# Patient Record
Sex: MALE | Race: WHITE | NOT HISPANIC OR LATINO | Employment: OTHER | URBAN - METROPOLITAN AREA
[De-identification: names, ages, dates, MRNs, and addresses within clinical notes are randomized per-mention and may not be internally consistent; named-entity substitution may affect disease eponyms.]

---

## 2017-09-27 ENCOUNTER — ALLSCRIPTS OFFICE VISIT (OUTPATIENT)
Dept: OTHER | Facility: OTHER | Age: 62
End: 2017-09-27

## 2017-10-27 NOTE — PROGRESS NOTES
Assessment  1  Status post insertion of drug-eluting stent into left anterior descending (LAD) artery for   coronary artery disease (V45 82) (Z95 5)   2  Type 2 diabetes mellitus with hyperglycemia (250 00) (E11 65)   3  Benign essential hypertension (401 1) (I10)   4  Dyslipidemia (272 4) (E78 5)   5  Obesity, Class I, BMI 30-34 9 (278 00) (E66 9)   6  Insomnia due to medical condition (327 01) (G47 01)   7  Easy fatigability (780 79) (R53 83)    Plan   · EKG/ECG- POC; Status:Complete;   Done: 36THM8832   Perform: In Office; [phone]; Last Updated By:Jose Juan Gates; 9/27/2017 2:30:21 PM;Ordered; For:Benign essential hypertension; Ordered By:Juan Conrad;   · Farxiga 5 MG Oral Tablet; TAKE 1 TABLET BY MOUTH EVERY MORNING   Rx By: Deirdre Vidal; Dispense: 30 Days ; #:30 Tablet; Refill: 0;For: Type 2 diabetes mellitus with hyperglycemia; ROSA = N; Record   · Toujeo SoloStar 300 UNIT/ML Subcutaneous Solution Pen-injector; INJECT 60  UNIT Daily   Rx By: Deirdre Vidal; Dispense: 0 Days ; #:3 ML; Refill: 0;For: Type 2 diabetes mellitus with hyperglycemia; ROSA = N; Record      1  Continue present medications  2  Obtain last comprehensive lab work from office of Dr Markie Davis  3  Letter to Dr Markie Daivs including suggestion to evaluate for sleep apnea  4  Six-month cardiology follow-up with EKG  Discussion/Summary      Stable CAD with anterior wall myocardial infarction and PCI of LAD 03/17/2014-[facility] had Resolute Integrity Rx 3 0 x 30 mm drug-eluting stent placed  Longstanding insulin-dependent diabetes mellitus, type 2 the  Controlled essential hypertensionTreated dyslipidemiaRecurrent cellulitis of bilateral pretibial regionsHistory of insomnia and daytime fatigue with consideration of obstructive sleep apnea  History of abdominal aortic aneurysmFormer chronic smoking of 1 pack per day for 43 years with none for 3-1/2 yearsObesity, class 1    The patient was counseled regarding diagnostic results,-- instructions for management,-- risk factor reductions,-- prognosis,-- patient and family education,-- impressions,-- importance of compliance with treatment  The patient has the current Goals: Maintenance of cardiovascular health  The patent has the current Barriers: None  Patient is able to Self-Care  The treatment plan was reviewed with the patient/guardian  The patient/guardian understands and agrees with the treatment plan     Self Referrals: Yes      Chief Complaint  Vincent Shipman made the appointment to come here on his own  He was a patient of [address]  Release was signed and faxed today to obtain those records, he also has a stent through [address]  He states he is short of breath at times  Patient is also taking Faxia? and Tuojeo? JW      History of Present Illness  HPI:   70-year-old man is self-referred for cardiology follow-up with his prior cardiologist no longer available  His history is pertinent for an anterior wall myocardial infarction followed by PCI of LAD on 03/17/2014 at [facility]  At that time, he had a 100% thrombotic occlusion of mid LAD, 50% mid LCX, ostial disease of a nondominant RCA with 45% ejection fraction  An echocardiogram on 12/27/2016 showed 60% ejection fraction with trace AI and no other significant abnormality  The patient has known hypertension, mixed dyslipidemia, past history of abdominal aortic aneurysm, longstanding diabetes mellitus, insulin dependent, was a former smoker of 1 pack per day for 40 years, having quit in March, 2014  There is no history of alcohol or illicit drug abuse  patient had a nuclear stress test on 12/27/2016 showing moderate exercise tolerance of 8 metabolic equivalents and normal nuclear perfusion with normal LV systolic function including ejection fraction of 59%  patient currently complains of easy fatigue, insomnia, diffuse myalgias, arthralgias, but denies chest pain, dyspnea, syncope, palpitations   He has had lower extremity swelling for 3 and half years  He dates this back to the time of his myocardial infarction  patient still works as a  full-time, averaging 48 hours per week  Review of Systems    ROS reviewed  All other systems negative, except as noted in history of present illness  Active Problems  1  _ (272 2)   2  Benign essential hypertension (401 1) (I10)   3  Contusion Of The Toe(S) With Intact Skin Surface (924 3)   4  Organic impotence (607 84) (N52 9)   5  Type 2 diabetes mellitus with hyperglycemia (250 00) (E11 65)    Past Medical History      Insulin-dependent diabetes mellitus, type 2 since the age of 36,  long-standing hypertension, dyslipidemia, recurrent superficial cellulitis of both lower extremities  The active problems and past medical history were reviewed and updated today  Surgical History    The surgical history was reviewed and updated today  Family History  Mother    · No pertinent family history    The family history was reviewed and updated today  Social History   · Former smoker (O91 00) (E38 875)  The social history was reviewed and updated today  Patient is a  at "[employer] and works four 12 hour shifts per week  He lives home with his long-term fiancee for the past 28 years, has 2 grown children and 2 grandchildren  He is   No tobacco use since March, 2014 and no alcohol or illicit drug use      Current Meds   1  Aspirin 81 MG Oral Tablet Delayed Release; TAKE 1 TABLET DAILY; Therapy: 37TTN1869 to Recorded   2  Atorvastatin Calcium 80 MG Oral Tablet; TAKE 1 TABLET DAILY; Therapy: 76BBR8334 to (Renew:07Oct2017) Recorded   3  BD Insulin Syringe 29G X 1/2 0 5 ML MISC; as directed; Therapy: 37Uzb6151 to  Requested for: 58CPO5833 Recorded   4  BD Pen Needle Mini U/F 31G X 5 MM Miscellaneous; use daily; Therapy: 99Nmz0503 to  Requested for: 07XGT1623 Recorded   5   Carvedilol 6 25 MG Oral Tablet; 1 two times a day;   Therapy: 91KYA0979 to Recorded   6  Glimepiride 4 MG Oral Tablet; TAKE 1 TABLET TWICE DAILY; Therapy: 44CDU7348 to Recorded   7  HumaLOG KwikPen 100 UNIT/ML SOLN; USE AS DIRECTED; Therapy: 75SNJ8015 to  Requested for: 16HTZ1497 Recorded   8  Lancets Thin Miscellaneous; use as needed; Therapy: 54CIS0417 to  Requested for: 51YDH0809 Recorded   9  Lisinopril 5 MG Oral Tablet; TAKE 1 TABLET DAILY; Therapy: 77FJF3570 to (Carina Lair) Recorded   10  Spironolactone 25 MG Oral Tablet; Take 1 tablet daily; Therapy: 20PTJ2883 to Recorded    The medication list was reviewed and updated today  Allergies  1  No Known Allergies    Vitals  Signs   Heart Rate: 87, Apical  Systolic: 959, RUE, Sitting  Diastolic: 78, RUE, Sitting  BP Cuff Size: Large  Height: 5 ft 11 in  Weight: 216 lb 4 8 oz  BMI Calculated: 30 17  BSA Calculated: 2 18  O2 Saturation: 95, RA    Physical Exam    Constitutional   General appearance: Abnormal  -- Obese white male in no acute distress  He is alert, oriented, and cooperative  Eyes   Conjunctiva and Sclera examination: Conjunctiva pink, sclera anicteric  Ears, Nose, Mouth, and Throat - Oropharynx: Clear, nares are clear, mucous membranes are moist    Neck   Neck and thyroid: Normal, supple, trachea midline, no thyromegaly  Pulmonary   Respiratory effort: No increased work of breathing or signs of respiratory distress  Auscultation of lungs: Clear to auscultation, no rales, no rhonchi, no wheezing, good air movement  Cardiovascular   Auscultation of heart: Normal rate and rhythm, normal S1 and S2, no murmurs  Carotid pulses: Normal, 2+ bilaterally  Peripheral vascular exam: Normal pulses throughout, no tenderness, erythema or swelling  Pedal pulses: Normal, 2+ bilaterally  Examination of extremities for edema and/or varicosities: Normal     Abdomen   Abdomen: Abnormal  -- Obese and nontender with no masses, organomegaly, or guarding     Liver and spleen: No hepatomegaly or splenomegaly  Musculoskeletal Gait and station: Normal gait  -- Digits and nails: Normal without clubbing or cyanosis  -- Inspection/palpation of joints, bones, and muscles: Normal, ROM normal     Skin - Skin and subcutaneous tissue: Normal without rashes or lesions  Skin is warm and well perfused, normal turgor  Neurologic - Cranial nerves: II - XII intact  -- Speech: Normal     Psychiatric - Orientation to person, place, and time: Normal -- Mood and affect: Normal       Results/Data  EKG 09/27/2017frontal plane QRS axis  unremarkable EKG      Signatures   Electronically signed by : HAN Patel ; Sep 27 2017 11:46PM EST                       (Author)

## 2018-01-15 VITALS
WEIGHT: 216.3 LBS | BODY MASS INDEX: 30.28 KG/M2 | HEART RATE: 87 BPM | OXYGEN SATURATION: 95 % | SYSTOLIC BLOOD PRESSURE: 130 MMHG | DIASTOLIC BLOOD PRESSURE: 78 MMHG | HEIGHT: 71 IN

## 2018-01-15 NOTE — CONSULTS
Dear Dr Lyn Vu,      I had the pleasure of evaluating your patient, JONEL SILVESTRE  My full evaluation follows:          Chief Complaint  Maureen Valdes made the appointment to come here on his own  He was a patient of [address]  Release was signed and faxed today to obtain those records, he also has a stent through [address]  He states he is short of breath at times  Patient is also taking Faxia? and Tuojeo? JW      History of Present Illness  HPI:     This 80-year-old man is self-referred for cardiology follow-up with his prior cardiologist no longer available  His history is pertinent for an anterior wall myocardial infarction followed by PCI of LAD on 03/17/2014 at [facility]  At that time, he had a 100% thrombotic occlusion of mid LAD, 50% mid LCX, ostial disease of a nondominant RCA with 45% ejection fraction  An echocardiogram on 12/27/2016 showed 60% ejection fraction with trace AI and no other significant abnormality  The patient has known hypertension, mixed dyslipidemia, past history of abdominal aortic aneurysm, longstanding diabetes mellitus, insulin dependent, was a former smoker of 1 pack per day for 40 years, having quit in March, 2014  There is no history of alcohol or illicit drug abuse  The patient had a nuclear stress test on 12/27/2016 showing moderate exercise tolerance of 8 metabolic equivalents and normal nuclear perfusion with normal LV systolic function including ejection fraction of 59%  The patient currently complains of easy fatigue, insomnia, diffuse myalgias, arthralgias, but denies chest pain, dyspnea, syncope, palpitations  He has had lower extremity swelling for 3 and half years  He dates this back to the time of his myocardial infarction  The patient still works as a  full-time, averaging 48 hours per week  Review of Systems    ROS reviewed  All other systems negative, except as noted in history of present illness  Active Problems    1  _ (272 2)   2  Benign essential hypertension (401 1) (I10)   3  Contusion Of The Toe(S) With Intact Skin Surface (924 3)   4  Organic impotence (607 84) (N52 9)   5  Type 2 diabetes mellitus with hyperglycemia (250 00) (E11 65)    Past Medical History      Insulin-dependent diabetes mellitus, type 2 since the age of 36,  long-standing hypertension, dyslipidemia, recurrent superficial cellulitis of both lower extremities  The active problems and past medical history were reviewed and updated today  Surgical History    The surgical history was reviewed and updated today  Family History    · No pertinent family history    The family history was reviewed and updated today  Social History    · Former smoker (U47 41) (A59 700)  The social history was reviewed and updated today  Patient is a  at [email or web address] and works four 12 hour shifts per week  He lives home with his long-term fiancee for the past 28 years, has 2 grown children and 2 grandchildren  He is   No tobacco use since March, 2014 and no alcohol or illicit drug use      Current Meds   1  Aspirin 81 MG Oral Tablet Delayed Release; TAKE 1 TABLET DAILY; Therapy: 85GTC1145 to Recorded   2  Atorvastatin Calcium 80 MG Oral Tablet; TAKE 1 TABLET DAILY; Therapy: 74TLS1615 to (Renew:07Oct2017) Recorded   3  BD Insulin Syringe 29G X 1/2" 0 5 ML MISC; as directed; Therapy: 44Wya7563 to  Requested for: 22EDA6859 Recorded   4  BD Pen Needle Mini U/F 31G X 5 MM Miscellaneous; use daily; Therapy: 15Nrw6902 to  Requested for: 83ZZE3977 Recorded   5  Carvedilol 6 25 MG Oral Tablet; 1 two times a day; Therapy: 81OEF5730 to Recorded   6  Glimepiride 4 MG Oral Tablet; TAKE 1 TABLET TWICE DAILY; Therapy: 09ZMI4975 to Recorded   7  HumaLOG KwikPen 100 UNIT/ML SOLN; USE AS DIRECTED; Therapy: 77ZAN7445 to  Requested for: 22CKW8909 Recorded   8  Lancets Thin Miscellaneous; use as needed;    Therapy: 97OFB4256 to  Requested for: 94HTA7447 Recorded   9  Lisinopril 5 MG Oral Tablet; TAKE 1 TABLET DAILY; Therapy: 85BBT5212 to (Rohit Flower) Recorded   10  Spironolactone 25 MG Oral Tablet; Take 1 tablet daily; Therapy: 68FJW3123 to Recorded    The medication list was reviewed and updated today  Allergies    1  No Known Allergies    Vitals  Signs    Heart Rate: 87, Apical  Systolic: 711, RUE, Sitting  Diastolic: 78, RUE, Sitting  BP Cuff Size: Large  Height: 5 ft 11 in  Weight: 216 lb 4 8 oz  BMI Calculated: 30 17  BSA Calculated: 2 18  O2 Saturation: 95, RA    Physical Exam    Constitutional   General appearance: Abnormal   Obese white male in no acute distress  He is alert, oriented, and cooperative  Eyes   Conjunctiva and Sclera examination: Conjunctiva pink, sclera anicteric  Ears, Nose, Mouth, and Throat - Oropharynx: Clear, nares are clear, mucous membranes are moist    Neck   Neck and thyroid: Normal, supple, trachea midline, no thyromegaly  Pulmonary   Respiratory effort: No increased work of breathing or signs of respiratory distress  Auscultation of lungs: Clear to auscultation, no rales, no rhonchi, no wheezing, good air movement  Cardiovascular   Auscultation of heart: Normal rate and rhythm, normal S1 and S2, no murmurs  Carotid pulses: Normal, 2+ bilaterally  Peripheral vascular exam: Normal pulses throughout, no tenderness, erythema or swelling  Pedal pulses: Normal, 2+ bilaterally  Examination of extremities for edema and/or varicosities: Normal     Abdomen   Abdomen: Abnormal   Obese and nontender with no masses, organomegaly, or guarding  Liver and spleen: No hepatomegaly or splenomegaly  Musculoskeletal Gait and station: Normal gait  Digits and nails: Normal without clubbing or cyanosis  Inspection/palpation of joints, bones, and muscles: Normal, ROM normal     Skin - Skin and subcutaneous tissue: Normal without rashes or lesions  Skin is warm and well perfused, normal turgor  Neurologic - Cranial nerves: II - XII intact  Speech: Normal     Psychiatric - Orientation to person, place, and time: Normal  Mood and affect: Normal       Results/Data  EKG 09/27/2017  Rightward frontal plane QRS axis  Otherwise unremarkable EKG      Assessment    1  Status post insertion of drug-eluting stent into left anterior descending (LAD) artery for   coronary artery disease (V45 82) (Z95 5)   2  Type 2 diabetes mellitus with hyperglycemia (250 00) (E11 65)   3  Benign essential hypertension (401 1) (I10)   4  Dyslipidemia (272 4) (E78 5)   5  Obesity, Class I, BMI 30-34 9 (278 00) (E66 9)   6  Insomnia due to medical condition (327 01) (G47 01)   7  Easy fatigability (780 79) (R53 83)    Plan  Benign essential hypertension    · EKG/ECG- POC; Status:Complete;   Done: 81GDZ8474   Perform: In Office; [phone]; Last Updated By:Stephanie Gates; 9/27/2017 2:30:21 PM;Ordered; For:Benign essential hypertension; Ordered By:Juan Conrad;  Type 2 diabetes mellitus with hyperglycemia    · Farxiga 5 MG Oral Tablet; TAKE 1 TABLET BY MOUTH EVERY MORNING   Rx By: Berto German; Dispense: 30 Days ; #:30 Tablet; Refill: 0; For: Type 2 diabetes mellitus with hyperglycemia; ROSA = N; Record   · Toujeo SoloStar 300 UNIT/ML Subcutaneous Solution Pen-injector; INJECT 60  UNIT Daily   Rx By: Berto German; Dispense: 0 Days ; #:3 ML; Refill: 0; For: Type 2 diabetes mellitus with hyperglycemia; ROSA = N; Record      1  Continue present medications  2  Obtain last comprehensive lab work from office of Dr Ivone Morrell  3  Letter to Dr Ivone Morrell including suggestion to evaluate for sleep apnea  4  Six-month cardiology follow-up with EKG  Discussion/Summary        1  Stable CAD with anterior wall myocardial infarction and PCI of LAD 03/17/2014-[address]  Patient had Resolute Integrity Rx 3 0 x 30 mm drug-eluting stent placed    2  Longstanding insulin-dependent diabetes mellitus, type 2 the   3  Controlled essential hypertension  4  Treated dyslipidemia  5  Recurrent cellulitis of bilateral pretibial regions  6  History of insomnia and daytime fatigue with consideration of obstructive sleep apnea  7  History of abdominal aortic aneurysm  8  Former chronic smoking of 1 pack per day for 43 years with none for 3-1/2 years  9  Obesity, class 1    The patient was counseled regarding diagnostic results, instructions for management, risk factor reductions, prognosis, patient and family education, impressions, importance of compliance with treatment  The patient has the current Goals: Maintenance of cardiovascular health  The patent has the current Barriers: None  Patient is able to Self-Care  The treatment plan was reviewed with the patient/guardian  The patient/guardian understands and agrees with the treatment plan     Self Referrals: Yes          Thank you very much for allowing me to participate in the care of this patient  If you have any questions, please do not hesitate to contact me  With warmest regards,    Renetta Downey MD      Signatures   Electronically signed by : HAN Aceves ; Sep 27 2017 11:46PM EST                       (Author) Consent (Scalp)/Introductory Paragraph: The rationale for Mohs was explained to the patient and consent was obtained. The risks, benefits and alternatives to therapy were discussed in detail. Specifically, the risks of changes in hair growth pattern secondary to repair, infection, scarring, bleeding, prolonged wound healing, incomplete removal, allergy to anesthesia, nerve injury and recurrence were addressed. Prior to the procedure, the treatment site was clearly identified and confirmed by the patient. All components of Universal Protocol/PAUSE Rule completed.

## 2020-05-01 ENCOUNTER — TELEPHONE (OUTPATIENT)
Dept: CARDIOLOGY CLINIC | Facility: CLINIC | Age: 65
End: 2020-05-01

## 2020-06-08 ENCOUNTER — TELEPHONE (OUTPATIENT)
Dept: PHYSICAL THERAPY | Facility: OTHER | Age: 65
End: 2020-06-08

## 2021-02-10 ENCOUNTER — TELEPHONE (OUTPATIENT)
Dept: CARDIOLOGY CLINIC | Facility: CLINIC | Age: 66
End: 2021-02-10

## 2021-02-10 NOTE — TELEPHONE ENCOUNTER
COVID Pre-Visit Screening       1  Have you traveled outside of your state in the past 2 weeks? No  2  Do you presently have a fever or flu-like symptoms? No  3  Do you have symptoms of an upper respiratory infection like runny nose, sore throat, or cough? No  4  Are you suffering from new headache that you have not had in the past?  No  5  Do you have/have you experienced any new shortness of breath recently? No  6  Do you have any new diarrhea, nausea or vomiting? No  7  Have you been in contact with anyone who has been sick or diagnosed with COVID-19? No  8  Do you have any new loss of taste or smell? No  9  Are you able to wear a mask without a valve for the entire visit?  Yes

## 2021-02-11 ENCOUNTER — TELEPHONE (OUTPATIENT)
Dept: CARDIOLOGY CLINIC | Facility: CLINIC | Age: 66
End: 2021-02-11

## 2021-02-11 ENCOUNTER — OFFICE VISIT (OUTPATIENT)
Dept: CARDIOLOGY CLINIC | Facility: CLINIC | Age: 66
End: 2021-02-11
Payer: COMMERCIAL

## 2021-02-11 VITALS
WEIGHT: 249 LBS | OXYGEN SATURATION: 97 % | BODY MASS INDEX: 34.86 KG/M2 | HEIGHT: 71 IN | HEART RATE: 80 BPM | SYSTOLIC BLOOD PRESSURE: 134 MMHG | RESPIRATION RATE: 18 BRPM | DIASTOLIC BLOOD PRESSURE: 70 MMHG | TEMPERATURE: 97.4 F

## 2021-02-11 DIAGNOSIS — Z79.4 TYPE 2 DIABETES MELLITUS WITH HYPEROSMOLARITY WITHOUT COMA, WITH LONG-TERM CURRENT USE OF INSULIN (HCC): ICD-10-CM

## 2021-02-11 DIAGNOSIS — Z98.61 CAD S/P PERCUTANEOUS CORONARY ANGIOPLASTY: Primary | ICD-10-CM

## 2021-02-11 DIAGNOSIS — E78.5 DYSLIPIDEMIA: ICD-10-CM

## 2021-02-11 DIAGNOSIS — I25.2 HISTORY OF ANTERIOR WALL MYOCARDIAL INFARCTION: ICD-10-CM

## 2021-02-11 DIAGNOSIS — I25.10 CAD S/P PERCUTANEOUS CORONARY ANGIOPLASTY: Primary | ICD-10-CM

## 2021-02-11 DIAGNOSIS — E66.9 CLASS 1 OBESITY: ICD-10-CM

## 2021-02-11 DIAGNOSIS — I10 ESSENTIAL HYPERTENSION: ICD-10-CM

## 2021-02-11 DIAGNOSIS — I71.4 ABDOMINAL AORTIC ANEURYSM (AAA) GREATER THAN 39 MM IN DIAMETER (HCC): ICD-10-CM

## 2021-02-11 DIAGNOSIS — E11.00 TYPE 2 DIABETES MELLITUS WITH HYPEROSMOLARITY WITHOUT COMA, WITH LONG-TERM CURRENT USE OF INSULIN (HCC): ICD-10-CM

## 2021-02-11 PROBLEM — E66.811 CLASS 1 OBESITY: Status: ACTIVE | Noted: 2021-02-11

## 2021-02-11 PROBLEM — I71.40 ABDOMINAL AORTIC ANEURYSM (AAA) GREATER THAN 39 MM IN DIAMETER: Status: ACTIVE | Noted: 2021-02-11

## 2021-02-11 PROCEDURE — 99205 OFFICE O/P NEW HI 60 MIN: CPT | Performed by: INTERNAL MEDICINE

## 2021-02-11 PROCEDURE — 93000 ELECTROCARDIOGRAM COMPLETE: CPT | Performed by: INTERNAL MEDICINE

## 2021-02-11 RX ORDER — FUROSEMIDE 80 MG
80 TABLET ORAL DAILY PRN
COMMUNITY
End: 2022-02-01 | Stop reason: SDDI

## 2021-02-11 RX ORDER — CARVEDILOL 6.25 MG/1
6.25 TABLET ORAL 2 TIMES DAILY WITH MEALS
Qty: 60 TABLET | Refills: 5 | Status: SHIPPED | OUTPATIENT
Start: 2021-02-11 | End: 2022-01-24 | Stop reason: SDUPTHER

## 2021-02-11 RX ORDER — GABAPENTIN 600 MG/1
600 TABLET ORAL 3 TIMES DAILY
COMMUNITY
Start: 2020-12-09 | End: 2021-02-11 | Stop reason: SDDI

## 2021-02-11 RX ORDER — SPIRONOLACTONE 25 MG/1
25 TABLET ORAL DAILY
Qty: 30 TABLET | Refills: 5 | Status: SHIPPED | OUTPATIENT
Start: 2021-02-11 | End: 2021-08-17

## 2021-02-11 RX ORDER — LISINOPRIL 5 MG/1
1 TABLET ORAL DAILY
COMMUNITY
Start: 2017-09-27 | End: 2021-02-11 | Stop reason: SDUPTHER

## 2021-02-11 RX ORDER — CARVEDILOL 6.25 MG/1
TABLET ORAL 2 TIMES DAILY
COMMUNITY
Start: 2017-09-27 | End: 2021-02-11 | Stop reason: SDUPTHER

## 2021-02-11 RX ORDER — ATORVASTATIN CALCIUM 80 MG/1
1 TABLET, FILM COATED ORAL DAILY
COMMUNITY
Start: 2017-09-27 | End: 2022-02-01 | Stop reason: SDUPTHER

## 2021-02-11 RX ORDER — LISINOPRIL 5 MG/1
5 TABLET ORAL DAILY
Qty: 30 TABLET | Refills: 5 | Status: SHIPPED | OUTPATIENT
Start: 2021-02-11 | End: 2021-08-17

## 2021-02-11 RX ORDER — SPIRONOLACTONE 25 MG/1
1 TABLET ORAL DAILY
COMMUNITY
Start: 2017-09-27 | End: 2021-02-11 | Stop reason: SDUPTHER

## 2021-02-11 NOTE — TELEPHONE ENCOUNTER
Per [facility] [place] Rx'd Furosemide 80 mg once daily as needed

## 2021-02-11 NOTE — PROGRESS NOTES
HISTORY & PHYSICAL  Mahsa Clark 72 y o  male MRN: [id number]  Unit/Bed#:  Encounter: [id number]  Assessment:     1  Stable CAD with anterior wall myocardial infarction and PCI of LAD 03/17/2014-[address] and had Resolute Integrity Rx 3 0 x 30 mm drug-eluting stent placed  2  Longstanding insulin-dependent diabetes mellitus, type 2  complicated by diabetic polyneuropathy of both feet  3 Controlled essential hypertension   4  Treated dyslipidemia the patient takes atorvastatin only sporadically because of muscle cramps  5  Recurrent cellulitis of bilateral pretibial regions , but none recently  6  History of insomnia and daytime fatigue with consideration of obstructive sleep apnea  7  History of abdominal aortic aneurysm , but we have no documentation of basis for diagnosis  8  Former chronic smoking of 1 pack per day for 43 years with none for 7 years   9  Worsening obesity, class 1 with approximate 30 pound weight gain in the past 3-1/2 years  Plan:      Patient Instructions     1  Continue current medication for now, but we may make future adjustments, depending on results of upcoming testing  2  We have ordered a Lexiscan nuclear stress study which is done at rest for [facility] to be done in mid April, 2021  Along with that, we ordered a fasting lipid panel and hemoglobin A1c to recheck cholesterol and control of diabetes, which can be done the same day in the morning  Reduce insulin dose in the morning by half and bring a snack or food with you, so we can avoid low blood sugar  3  We renewed three of your prescriptions and sent them to the [address] [place] [address] in [place], [place]   4  We will contact you when we get the results of your test back and decide on any medication adjustments and future follow-up with Cardiology          Current Outpatient Medications   Medication Sig Dispense Refill    atorvastatin (LIPITOR) 80 mg tablet Take 1 tablet by mouth daily      carvedilol (COREG) 6 25 mg tablet Take 1 tablet (6 25 mg total) by mouth 2 (two) times a day with meals 60 tablet 5    lisinopril (ZESTRIL) 5 mg tablet Take 1 tablet (5 mg total) by mouth daily 30 tablet 5    spironolactone (ALDACTONE) 25 mg tablet Take 1 tablet (25 mg total) by mouth daily 30 tablet 5    furosemide (LASIX) 80 mg tablet Take 80 mg by mouth daily as needed       No current facility-administered medications for this visit  Counseling :   A description of the counseling / coordination of care:   Patient was counseled regarding his approximate 30 pound weight gain, need for cardiac re-evaluation, need for reassessment of lipid panel and diabetes mellitus  There was no coordination of care at this time       Goals and Barriers: To optimize cardiac status  Patient has been non adherent to medical follow-up in the past   Patient's ability to self care:  Satisfactory  Medication side effect reviewed with patient in detail and all their questions answered  History of Present Illness     Chief Complaint:    HPI:     Franc Wolfe is a 72y o  year old male who presents for cardiology re-evaluation after an absence of nearly 3-1/2 years with an original consultation with me on 09/27/2017  At present, the patient complains of feeling run down, generalized aches and pains, generalized weakness and exhaustion, and a high stress level because of relationship issues  He denies recent chest pain, but admits to dyspnea when walking up hill, denies palpitations, syncope, lightheadedness, dizziness, cough, sputum production, wheezing, fever, chills  He has had edema of his ankles in the past month and was prescribed furosemide 80 milligrams for use as needed  The edema seemed to follow the ingestion of salty foods  He also complains of chronic stable nocturia 3-4 times a night  The patient recently had an eye examination and was not found to have any diabetic retinopathy    He is a known case of diabetic polyneuropathy involving his feet  He also has chronic low back and knee pain  The patient was recently able to snow shovel without dyspnea or chest pain but did experience some low back pain with that activity  This patient had a traumatic sternal fracture about one and half years ago in still feels like there is a movement of his sternum when he twists and turns in certain ways  The patient also has bilateral rib fractures after several falls in the past several years  The patient currently lives near [place], [place] with a girlfriend of 31 years duration  Each of them have two children of their own  The patient is retired  from [employer], having retired 2-3 years ago  He depends on social security and a small pension for income  He recently became a [employer] [place] insurance patient and thinks he may have to switch primary care providers because of the insurance  Because of sporadic lower extremity "charley horses", the patient has been taking his a tore of a statin only sporadically  He mentions that he was a former opioid user for his body aches and pains but quit using opioids last year  The patient denies use of alcohol and illicit drugs and stop smoking in 2014  However, he has chronic exposure to secondhand smoke from his girlfriend, with whom he lives  This patient has a history of CAD with remote anterior wall myocardial infarction and PCI of LAD on 03/17/2014 at [facility], longstanding insulin-dependent diabetes mellitus, type 2, essential hypertension, dyslipidemia, recurrent cellulitis of bilateral pretibial regions, chronic insomnia with previous consideration of obstructive sleep apnea, for which she has never been evaluated, former smoking of one pack per day for 43 years with none for seven years, class 1 obesity, and remote history of abdominal aortic aneurysm, though we have no documentation of this      The patient's cardiac testing includes an echocardiogram and nuclear stress test on 12/27/2016 with normal nuclear stress test including ejection fraction of 59% at that time  Historical Information   History reviewed  No pertinent past medical history  History reviewed  No pertinent surgical history  Social History     Substance and Sexual Activity   Alcohol Use Not Currently     Social History     Substance and Sexual Activity   Drug Use Not on file     Social History     Tobacco Use   Smoking Status Former Smoker     History reviewed  No pertinent family history  Meds/Allergies   Prior to Admission medications    Medication Sig Start Date End Date Taking? Authorizing Provider   atorvastatin (LIPITOR) 80 mg tablet Take 1 tablet by mouth daily 9/27/17  Yes Historical Provider, MD   carvedilol (COREG) 6 25 mg tablet Take 1 tablet (6 25 mg total) by mouth 2 (two) times a day with meals 2/11/21  Yes Jennifer Nix MD   lisinopril (ZESTRIL) 5 mg tablet Take 1 tablet (5 mg total) by mouth daily 2/11/21  Yes Jennifer Nix MD   spironolactone (ALDACTONE) 25 mg tablet Take 1 tablet (25 mg total) by mouth daily 2/11/21  Yes Jennifer Nix MD   carvedilol (COREG) 6 25 mg tablet Take by mouth 2 (two) times a day 9/27/17 2/11/21 Yes Historical Provider, MD   lisinopril (ZESTRIL) 5 mg tablet Take 1 tablet by mouth daily 9/27/17 2/11/21 Yes Historical Provider, MD   spironolactone (ALDACTONE) 25 mg tablet Take 1 tablet by mouth daily 9/27/17 2/11/21 Yes Historical Provider, MD   furosemide (LASIX) 80 mg tablet Take 80 mg by mouth daily as needed    Historical Provider, MD   gabapentin (NEURONTIN) 600 MG tablet Take 600 mg by mouth 3 (three) times a day 12/9/20 2/11/21  Historical Provider, MD     No Known Allergies    Cardiovascular ROS:     Systems negative, except as noted in history of present illness        Objective   Vitals:   Vitals:    02/11/21 1100   BP: 134/70   BP Location: Left arm   Patient Position: Sitting   Cuff Size: Large   Pulse: 80   Resp: 18   Temp: (!) 97 4 °F (36 3 °C)   SpO2: 97%   Weight: 113 kg (249 lb)   Height: 5' 11" (1 803 m)       Body mass index is 34 73 kg/m²  32 7 pound weight gain in approximately 3-1/2 years  Physical Exam    General-Well-developed  Moderately obese  male  in no acute distress  Patient is alert, oriented X3 and cooperative  Skin-Warm and dry with no pallor, rashes, ecchymoses, lesions  HEENT-Normocephalic  Pupils equally round and reactive to light and accommodation  Extraocular muscles intact  Mucous membranes pink and moist  Sclerae nonicteric  Optic fundi poorly seen  Neck-No jugular venous distention, AJR, masses, thyromegaly, adenopathy, bruits  Lungs-No rales, rhonchi, wheezes  Heart-No murmur, gallop, rub, click, heave, thrill  Abdomen-Normal bowel sounds with no masses, organomegaly, guarding, tenderness, or rigidity  Marked abdominal obesity is noted  Extremities-No cyanosis, clubbing, edema, pulse decrease  Neurological-No focal neurological signs  Imaging:      EKG  02/11/2021:     Normal sinus rhythm at 74 bpm   Rightward frontal plane QRS axis   Slower heart rate by 14 bpm with no other changes compared to 09/27/2017    Chest x-ray, one view 11/09/2020:    Questionable left-sided pulmonary nodule and questionable infiltrate left base          Cardiac testing:      no recent cardiac testing      Labs:     No results found for: CHOLESTEROL  No results found for: HDL  No results found for: LDLCHOLEST  No results found for: LDLCALC  No components found for: DIRECTLDLREFLEX  No results found for: TRIG  No results found for: [place], [place]    Laboratory data 11/08/2020:    Glucose 141  Estimated GFR 94 with creatinine 0 79  Normal CBC, CMP except glucose 406 and   Normal magnesium, PT/PTT, NT-proBNP and troponin      Total visit time spent today 68 minutes  Aaron Hollingsworth MD

## 2021-02-11 NOTE — PATIENT INSTRUCTIONS
1  Continue current medication for now, but we may make future adjustments, depending on results of upcoming testing  2  We have ordered a Lexiscan nuclear stress study which is done at rest for [facility] to be done in mid April, 2021  Along with that, we ordered a fasting lipid panel and hemoglobin A1c to recheck cholesterol and control of diabetes, which can be done the same day in the morning  Reduce insulin dose in the morning by half and bring a snack or food with you, so we can avoid low blood sugar  3  We renewed three of your prescriptions and sent them to the [address] [place] [address] in [place], [place]   4  We will contact you when we get the results of your test back and decide on any medication adjustments and future follow-up with Cardiology

## 2021-04-02 ENCOUNTER — APPOINTMENT (OUTPATIENT)
Dept: LAB | Facility: CLINIC | Age: 66
End: 2021-04-02
Payer: COMMERCIAL

## 2021-04-02 ENCOUNTER — TRANSCRIBE ORDERS (OUTPATIENT)
Dept: LAB | Facility: CLINIC | Age: 66
End: 2021-04-02

## 2021-04-02 DIAGNOSIS — E11.00 TYPE 2 DIABETES MELLITUS WITH HYPEROSMOLARITY WITHOUT COMA, WITH LONG-TERM CURRENT USE OF INSULIN (HCC): ICD-10-CM

## 2021-04-02 DIAGNOSIS — Z98.61 CAD S/P PERCUTANEOUS CORONARY ANGIOPLASTY: ICD-10-CM

## 2021-04-02 DIAGNOSIS — E78.5 HYPERLIPIDEMIA, UNSPECIFIED HYPERLIPIDEMIA TYPE: ICD-10-CM

## 2021-04-02 DIAGNOSIS — E78.5 DYSLIPIDEMIA: ICD-10-CM

## 2021-04-02 DIAGNOSIS — E11.9 DIABETES MELLITUS WITHOUT COMPLICATION (HCC): Primary | ICD-10-CM

## 2021-04-02 DIAGNOSIS — I10 ESSENTIAL HYPERTENSION, MALIGNANT: ICD-10-CM

## 2021-04-02 DIAGNOSIS — Z79.4 TYPE 2 DIABETES MELLITUS WITH HYPEROSMOLARITY WITHOUT COMA, WITH LONG-TERM CURRENT USE OF INSULIN (HCC): ICD-10-CM

## 2021-04-02 DIAGNOSIS — I25.10 CAD S/P PERCUTANEOUS CORONARY ANGIOPLASTY: ICD-10-CM

## 2021-04-02 DIAGNOSIS — I25.119 ATHEROSCLEROSIS OF NATIVE CORONARY ARTERY WITH ANGINA PECTORIS, UNSPECIFIED WHETHER NATIVE OR TRANSPLANTED HEART (HCC): ICD-10-CM

## 2021-04-02 LAB
ALBUMIN SERPL BCP-MCNC: 3.9 G/DL (ref 3.5–5)
ALP SERPL-CCNC: 99 U/L (ref 46–116)
ALT SERPL W P-5'-P-CCNC: 41 U/L (ref 12–78)
ANION GAP SERPL CALCULATED.3IONS-SCNC: 6 MMOL/L (ref 4–13)
AST SERPL W P-5'-P-CCNC: 16 U/L (ref 5–45)
BASOPHILS # BLD AUTO: 0.06 THOUSANDS/ΜL (ref 0–0.1)
BASOPHILS NFR BLD AUTO: 1 % (ref 0–1)
BILIRUB SERPL-MCNC: 0.63 MG/DL (ref 0.2–1)
BUN SERPL-MCNC: 19 MG/DL (ref 5–25)
CALCIUM SERPL-MCNC: 10.3 MG/DL (ref 8.3–10.1)
CHLORIDE SERPL-SCNC: 102 MMOL/L (ref 100–108)
CHOLEST SERPL-MCNC: 192 MG/DL (ref 50–200)
CO2 SERPL-SCNC: 28 MMOL/L (ref 21–32)
CREAT SERPL-MCNC: 0.92 MG/DL (ref 0.6–1.3)
EOSINOPHIL # BLD AUTO: 0.1 THOUSAND/ΜL (ref 0–0.61)
EOSINOPHIL NFR BLD AUTO: 1 % (ref 0–6)
ERYTHROCYTE [DISTWIDTH] IN BLOOD BY AUTOMATED COUNT: 13.8 % (ref 11.6–15.1)
EST. AVERAGE GLUCOSE BLD GHB EST-MCNC: 232 MG/DL
GFR SERPL CREATININE-BSD FRML MDRD: 87 ML/MIN/1.73SQ M
GLUCOSE P FAST SERPL-MCNC: 236 MG/DL (ref 65–99)
HBA1C MFR BLD: 9.7 %
HCT VFR BLD AUTO: 53.5 % (ref 36.5–49.3)
HDLC SERPL-MCNC: 56 MG/DL
HGB BLD-MCNC: 17.1 G/DL (ref 12–17)
IMM GRANULOCYTES # BLD AUTO: 0.04 THOUSAND/UL (ref 0–0.2)
IMM GRANULOCYTES NFR BLD AUTO: 0 % (ref 0–2)
LDLC SERPL CALC-MCNC: 117 MG/DL (ref 0–100)
LYMPHOCYTES # BLD AUTO: 1.69 THOUSANDS/ΜL (ref 0.6–4.47)
LYMPHOCYTES NFR BLD AUTO: 17 % (ref 14–44)
MCH RBC QN AUTO: 28.7 PG (ref 26.8–34.3)
MCHC RBC AUTO-ENTMCNC: 32 G/DL (ref 31.4–37.4)
MCV RBC AUTO: 90 FL (ref 82–98)
MONOCYTES # BLD AUTO: 0.75 THOUSAND/ΜL (ref 0.17–1.22)
MONOCYTES NFR BLD AUTO: 7 % (ref 4–12)
NEUTROPHILS # BLD AUTO: 7.47 THOUSANDS/ΜL (ref 1.85–7.62)
NEUTS SEG NFR BLD AUTO: 74 % (ref 43–75)
NONHDLC SERPL-MCNC: 136 MG/DL
NRBC BLD AUTO-RTO: 0 /100 WBCS
PLATELET # BLD AUTO: 318 THOUSANDS/UL (ref 149–390)
PMV BLD AUTO: 10.4 FL (ref 8.9–12.7)
POTASSIUM SERPL-SCNC: 4.6 MMOL/L (ref 3.5–5.3)
PROT SERPL-MCNC: 7.9 G/DL (ref 6.4–8.2)
RBC # BLD AUTO: 5.95 MILLION/UL (ref 3.88–5.62)
SODIUM SERPL-SCNC: 136 MMOL/L (ref 136–145)
TRIGL SERPL-MCNC: 97 MG/DL
WBC # BLD AUTO: 10.11 THOUSAND/UL (ref 4.31–10.16)

## 2021-04-02 PROCEDURE — 83036 HEMOGLOBIN GLYCOSYLATED A1C: CPT | Performed by: INTERNAL MEDICINE

## 2021-04-02 PROCEDURE — 80061 LIPID PANEL: CPT | Performed by: INTERNAL MEDICINE

## 2021-04-02 PROCEDURE — 85025 COMPLETE CBC W/AUTO DIFF WBC: CPT | Performed by: INTERNAL MEDICINE

## 2021-04-02 PROCEDURE — 36415 COLL VENOUS BLD VENIPUNCTURE: CPT | Performed by: INTERNAL MEDICINE

## 2021-04-02 PROCEDURE — 80053 COMPREHEN METABOLIC PANEL: CPT | Performed by: INTERNAL MEDICINE

## 2021-04-12 ENCOUNTER — HOSPITAL ENCOUNTER (OUTPATIENT)
Dept: RADIOLOGY | Facility: HOSPITAL | Age: 66
Discharge: HOME | End: 2021-04-12
Attending: INTERNAL MEDICINE
Payer: COMMERCIAL

## 2021-04-12 ENCOUNTER — HOSPITAL ENCOUNTER (OUTPATIENT)
Dept: NON INVASIVE DIAGNOSTICS | Facility: HOSPITAL | Age: 66
Discharge: HOME | End: 2021-04-12
Attending: INTERNAL MEDICINE
Payer: COMMERCIAL

## 2021-04-12 DIAGNOSIS — I25.10 CAD S/P PERCUTANEOUS CORONARY ANGIOPLASTY: ICD-10-CM

## 2021-04-12 DIAGNOSIS — Z98.61 CAD S/P PERCUTANEOUS CORONARY ANGIOPLASTY: ICD-10-CM

## 2021-04-12 DIAGNOSIS — I25.2 HISTORY OF ANTERIOR WALL MYOCARDIAL INFARCTION: ICD-10-CM

## 2021-04-12 DIAGNOSIS — I10 ESSENTIAL HYPERTENSION: ICD-10-CM

## 2021-04-12 DIAGNOSIS — Z79.4 TYPE 2 DIABETES MELLITUS WITH HYPEROSMOLARITY WITHOUT COMA, WITH LONG-TERM CURRENT USE OF INSULIN (HCC): ICD-10-CM

## 2021-04-12 DIAGNOSIS — E11.00 TYPE 2 DIABETES MELLITUS WITH HYPEROSMOLARITY WITHOUT COMA, WITH LONG-TERM CURRENT USE OF INSULIN (HCC): ICD-10-CM

## 2021-04-12 LAB
CHEST PAIN STATEMENT: NORMAL
MAX DIASTOLIC BP: 84 MMHG
MAX HEART RATE: 77 BPM
MAX PREDICTED HEART RATE: 154 BPM
MAX. SYSTOLIC BP: 167 MMHG
PROTOCOL NAME: NORMAL
REASON FOR TERMINATION: NORMAL
TARGET HR FORMULA: NORMAL
TEST INDICATION: NORMAL
TIME IN EXERCISE PHASE: NORMAL

## 2021-04-12 PROCEDURE — A9502 TC99M TETROFOSMIN: HCPCS

## 2021-04-12 PROCEDURE — 78452 HT MUSCLE IMAGE SPECT MULT: CPT

## 2021-04-12 PROCEDURE — 93017 CV STRESS TEST TRACING ONLY: CPT

## 2021-04-12 PROCEDURE — G1004 CDSM NDSC: HCPCS

## 2021-04-12 RX ADMIN — REGADENOSON 0.4 MG: 0.08 INJECTION, SOLUTION INTRAVENOUS at 10:51

## 2021-04-13 PROCEDURE — 93016 CV STRESS TEST SUPVJ ONLY: CPT | Performed by: INTERNAL MEDICINE

## 2021-04-13 PROCEDURE — 78452 HT MUSCLE IMAGE SPECT MULT: CPT | Performed by: INTERNAL MEDICINE

## 2021-04-13 PROCEDURE — 93018 CV STRESS TEST I&R ONLY: CPT | Performed by: INTERNAL MEDICINE

## 2021-04-20 ENCOUNTER — TELEPHONE (OUTPATIENT)
Dept: CARDIOLOGY CLINIC | Facility: CLINIC | Age: 66
End: 2021-04-20

## 2021-04-20 DIAGNOSIS — I25.10 CAD S/P PERCUTANEOUS CORONARY ANGIOPLASTY: Primary | ICD-10-CM

## 2021-04-20 DIAGNOSIS — Z98.61 CAD S/P PERCUTANEOUS CORONARY ANGIOPLASTY: Primary | ICD-10-CM

## 2021-04-20 DIAGNOSIS — E78.5 DYSLIPIDEMIA: ICD-10-CM

## 2021-04-20 RX ORDER — EZETIMIBE 10 MG/1
10 TABLET ORAL DAILY
Qty: 30 TABLET | Refills: 5 | Status: SHIPPED | OUTPATIENT
Start: 2021-04-20 | End: 2022-02-01 | Stop reason: SDDI

## 2021-04-20 NOTE — PROGRESS NOTES
Spoke to patient by telephone   On 04/20/2021 and reviewed recent study results  These included a normal nuclear stress study  Blood work showed uncontrolled diabetes with A1c of 9 7% and estimated average glucose of 232  He will follow up on this with Dr Harshad Steven  In addition, I mentioned that his hemoglobin and hematocrit were slightly elevated and recommended he follow-up with this with his primary care provider  Finally, I addressed his LDL cholesterol which was 117, indicating that the target LDL was less than 70  I recommended addition of ezetimibe 10 milligrams daily to his current regimen to further lower the LDL cholesterol  Future considerations could be substitution of rosuvastatin for atorvastatin, use of the PCSK9 inhibitor

## 2021-04-20 NOTE — LETTER
April 20, 2021     Marielos Barcenas, [address] [facility]    Patient: Eduardo Roman   YOB: 1955   Date of Visit: 4/20/2021       Dear Dr Daysi Acosta:     the above patient followed up with me after a 3-1/2 year absence on 02/11/2021  He had a number of outpatient studies ordered by me and I  review these with him after difficulty reaching him on 04/20/2021  These included a normal nuclear stress study  Blood work showed uncontrolled diabetes with A1c of 9 7% and estimated average glucose of 232  He will follow up on this with Dr Daysi Acosta  In addition, I mentioned that his hemoglobin and hematocrit were slightly elevated and recommended he follow-up with this with Dr Daysi Acosta  Finally, I addressed his LDL cholesterol which was 117, indicating that the target LDL was less than 70  I recommended addition of ezetimibe 10 milligrams daily to his current regimen to further lower the LDL cholesterol  Future considerations could be substitution of rosuvastatin for atorvastatin, use of the PCSK9 inhibitor  If you have questions, please do not hesitate to call me  I look forward to following your patient along with you  Sincerely,        Consuelo Landrum MD        CC: No Recipients  Consuelo Landrum MD  4/20/2021 10:00 AM  Sign when Signing Visit  Spoke to patient by telephone   On 04/20/2021 and reviewed recent study results  These included a normal nuclear stress study  Blood work showed uncontrolled diabetes with A1c of 9 7% and estimated average glucose of 232  He will follow up on this with Dr Daysi Acosta  In addition, I mentioned that his hemoglobin and hematocrit were slightly elevated and recommended he follow-up with this with his primary care provider  Finally, I addressed his LDL cholesterol which was 117, indicating that the target LDL was less than 70  I recommended addition of ezetimibe 10 milligrams daily to his current regimen to further lower the LDL cholesterol  Future considerations could be substitution of rosuvastatin for atorvastatin, use of the PCSK9 inhibitor

## 2021-10-04 DIAGNOSIS — Z98.61 CAD S/P PERCUTANEOUS CORONARY ANGIOPLASTY: ICD-10-CM

## 2021-10-04 DIAGNOSIS — I25.10 CAD S/P PERCUTANEOUS CORONARY ANGIOPLASTY: ICD-10-CM

## 2021-10-04 DIAGNOSIS — I25.2 HISTORY OF ANTERIOR WALL MYOCARDIAL INFARCTION: ICD-10-CM

## 2021-10-04 DIAGNOSIS — I10 ESSENTIAL HYPERTENSION: ICD-10-CM

## 2021-10-04 RX ORDER — CARVEDILOL 6.25 MG/1
TABLET ORAL
Qty: 60 TABLET | Refills: 0 | OUTPATIENT
Start: 2021-10-04

## 2021-10-11 ENCOUNTER — APPOINTMENT (OUTPATIENT)
Dept: RADIOLOGY | Facility: CLINIC | Age: 66
End: 2021-10-11
Payer: COMMERCIAL

## 2021-10-11 DIAGNOSIS — M00.09: ICD-10-CM

## 2021-10-11 PROCEDURE — 73562 X-RAY EXAM OF KNEE 3: CPT

## 2022-01-24 DIAGNOSIS — I25.10 CAD S/P PERCUTANEOUS CORONARY ANGIOPLASTY: ICD-10-CM

## 2022-01-24 DIAGNOSIS — Z98.61 CAD S/P PERCUTANEOUS CORONARY ANGIOPLASTY: ICD-10-CM

## 2022-01-24 DIAGNOSIS — I10 ESSENTIAL HYPERTENSION: ICD-10-CM

## 2022-01-24 DIAGNOSIS — I25.2 HISTORY OF ANTERIOR WALL MYOCARDIAL INFARCTION: ICD-10-CM

## 2022-01-24 RX ORDER — CARVEDILOL 6.25 MG/1
6.25 TABLET ORAL 2 TIMES DAILY WITH MEALS
Qty: 60 TABLET | Refills: 5 | Status: SHIPPED | OUTPATIENT
Start: 2022-01-24 | End: 2022-02-01 | Stop reason: SDUPTHER

## 2022-02-01 ENCOUNTER — OFFICE VISIT (OUTPATIENT)
Dept: CARDIOLOGY CLINIC | Facility: CLINIC | Age: 67
End: 2022-02-01
Payer: COMMERCIAL

## 2022-02-01 ENCOUNTER — TELEPHONE (OUTPATIENT)
Dept: OTHER | Facility: OTHER | Age: 67
End: 2022-02-01

## 2022-02-01 VITALS
HEART RATE: 73 BPM | OXYGEN SATURATION: 97 % | WEIGHT: 263 LBS | HEIGHT: 71 IN | TEMPERATURE: 97.9 F | SYSTOLIC BLOOD PRESSURE: 120 MMHG | BODY MASS INDEX: 36.82 KG/M2 | DIASTOLIC BLOOD PRESSURE: 80 MMHG

## 2022-02-01 DIAGNOSIS — E78.5 DYSLIPIDEMIA: ICD-10-CM

## 2022-02-01 DIAGNOSIS — R60.0 EDEMA OF BOTH LEGS: ICD-10-CM

## 2022-02-01 DIAGNOSIS — Z28.21 COVID-19 VACCINE SERIES DECLINED: ICD-10-CM

## 2022-02-01 DIAGNOSIS — R53.81 MALAISE AND FATIGUE: ICD-10-CM

## 2022-02-01 DIAGNOSIS — E66.9 CLASS 2 OBESITY: ICD-10-CM

## 2022-02-01 DIAGNOSIS — Z28.310 COVID-19 VACCINE SERIES DECLINED: ICD-10-CM

## 2022-02-01 DIAGNOSIS — R53.83 MALAISE AND FATIGUE: ICD-10-CM

## 2022-02-01 DIAGNOSIS — Z79.4 TYPE 2 DIABETES MELLITUS WITH HYPEROSMOLARITY WITHOUT COMA, WITH LONG-TERM CURRENT USE OF INSULIN (HCC): ICD-10-CM

## 2022-02-01 DIAGNOSIS — E11.00 TYPE 2 DIABETES MELLITUS WITH HYPEROSMOLARITY WITHOUT COMA, WITH LONG-TERM CURRENT USE OF INSULIN (HCC): ICD-10-CM

## 2022-02-01 DIAGNOSIS — R63.5 EXCESSIVE WEIGHT GAIN: ICD-10-CM

## 2022-02-01 DIAGNOSIS — I25.10 CAD S/P PERCUTANEOUS CORONARY ANGIOPLASTY: Primary | ICD-10-CM

## 2022-02-01 DIAGNOSIS — Z98.61 CAD S/P PERCUTANEOUS CORONARY ANGIOPLASTY: Primary | ICD-10-CM

## 2022-02-01 DIAGNOSIS — I25.2 HISTORY OF ANTERIOR WALL MYOCARDIAL INFARCTION: ICD-10-CM

## 2022-02-01 DIAGNOSIS — I10 ESSENTIAL HYPERTENSION: ICD-10-CM

## 2022-02-01 PROCEDURE — 99214 OFFICE O/P EST MOD 30 MIN: CPT | Performed by: INTERNAL MEDICINE

## 2022-02-01 PROCEDURE — 93000 ELECTROCARDIOGRAM COMPLETE: CPT | Performed by: INTERNAL MEDICINE

## 2022-02-01 RX ORDER — ATORVASTATIN CALCIUM 80 MG/1
80 TABLET, FILM COATED ORAL DAILY
Qty: 30 TABLET | Refills: 5 | Status: SHIPPED | OUTPATIENT
Start: 2022-02-01

## 2022-02-01 RX ORDER — BLOOD SUGAR DIAGNOSTIC
STRIP MISCELLANEOUS
COMMUNITY
Start: 2021-11-16

## 2022-02-01 RX ORDER — EZETIMIBE 10 MG/1
10 TABLET ORAL DAILY
Qty: 30 TABLET | Refills: 5 | Status: SHIPPED | OUTPATIENT
Start: 2022-02-01

## 2022-02-01 RX ORDER — LISINOPRIL 5 MG/1
5 TABLET ORAL DAILY
Qty: 30 TABLET | Refills: 5 | Status: SHIPPED | OUTPATIENT
Start: 2022-02-01

## 2022-02-01 RX ORDER — PIOGLITAZONEHYDROCHLORIDE 30 MG/1
TABLET ORAL
COMMUNITY
Start: 2021-11-29 | End: 2022-02-01 | Stop reason: SDDI

## 2022-02-01 RX ORDER — CARVEDILOL 6.25 MG/1
6.25 TABLET ORAL 2 TIMES DAILY WITH MEALS
Qty: 60 TABLET | Refills: 5 | Status: SHIPPED | OUTPATIENT
Start: 2022-02-01

## 2022-02-01 RX ORDER — ASPIRIN 81 MG/1
81 TABLET ORAL DAILY
COMMUNITY

## 2022-02-01 RX ORDER — FUROSEMIDE 40 MG/1
40 TABLET ORAL DAILY PRN
Qty: 30 TABLET | Refills: 5 | Status: SHIPPED | OUTPATIENT
Start: 2022-02-01

## 2022-02-01 RX ORDER — SPIRONOLACTONE 25 MG/1
25 TABLET ORAL DAILY
Qty: 30 TABLET | Refills: 5 | Status: SHIPPED | OUTPATIENT
Start: 2022-02-01

## 2022-02-01 NOTE — TELEPHONE ENCOUNTER
Danielle Ware, Pharmacist from [address] [place] [address] called regarding patient Alirio Rodriguez/  4 6 55 / She stated there is an interaction with two of his medications that was prescribed today by Dr Kalli Rob the two meds are lisinopril (ZESTRIL) 5 mg tablet and spironolactone (ALDACTONE) 25 mg tablet  Message sent to Dr Leopoldo Boas, he replied that the two meds  lisinopril (ZESTRIL) 5 mg tablet and spironolactone (ALDACTONE) 25 mg tablet  are ok to dispense  Britney Ruff, Pharmacist from [employer] [phone] made aware

## 2022-02-01 NOTE — PROGRESS NOTES
Office Cardiology Progress Note  Leia Naylor 77 y o  male MRN: [id number]  02/01/22  4:42 PM      ASSESSMENT:    1  Stable CAD with anterior wall myocardial infarction and PCI of LAD 03/17/2014-[address] and had Resolute Integrity Rx 3 0 x 30 mm drug-eluting stent placed  Had completely normal Lexiscan nuclear stress study on 04/12/2021    2  Longstanding insulin-dependent diabetes mellitus, type 2  complicated by diabetic polyneuropathy of both feet  Last known A1c and estimated average glucose on 04/02/2021 was 9 7% and 232  3 Controlled essential hypertension  4 Treated dyslipidemia the patient takes atorvastatin only sporadically because of muscle cramps  Last LDL cholesterol on 04/02/2021 was suboptimum at 117    5  Recurrent cellulitis of bilateral pretibial regions, but none recently  Has trace bilateral pretibial edema, probably related to progressive obesity  6  History of insomnia and daytime fatigue with consideration of obstructive sleep apnea  7  History of abdominal aortic aneurysm , but we have no documentation of basis for diagnosis  8  Former chronic smoking of 1 pack per day for 43 years with none for 8 years   9  Worsening obesity, class 2 with approximate 49 7 pound weight gain in the past 4-1/2 years and 14 pound weight gain in approximately one year  Has generally unhealthy diet  10  COVID vaccine series declined        Plan       Patient Instructions     1  Because of complaints of weight gain, leg swelling, tiredness and lack of recent results, we have ordered a battery of blood tests, including CMP, lipid panel, TSH, NT-proBNP, hemoglobin A1c, CBC   2  We have also requested an echocardiogram or cardiac ultrasound to be done at [facility] by appointment to recheck overall heart function as a potential cause for the above complaints    However, this test order was canceled because of patient's concern about out of pocket cost   3  We will contact you with the results of all these tests as they become available  4  We renewed six prescriptions and sent them to [facility]  5  Follow-up in one year with EKG  HPI    This 77 y o  male  denies new cardiopulmonary and medical symptoms  He does complain of occasional difficulty getting a full breath in for several years  He additionally complains of progressive weight gain, increased bilateral leg swelling, and tiredness all the time  He denies any recent chest pain, palpitations, syncope, falls, dizziness, lightheadedness, cough, sputum, wheezing, fever, chills  The patient has refused COVID and influenza vaccinations  The patient was seen by Dr Segundo  for some sort of inflamed knee issue, which was managed successfully with a 14 day course of steroids  Because of non adherence to follow-up, the patient is no longer on a new blood pressure medication ordered for him by his other primary care provider Dr Kristi Coombs  The patient prepares his own food but tends to eat steak, fish sticks, meat loaf, hamburger helper, pudding, cakes, cookies and adds sweeteners to water for flavoring  The patient alleges that recent fasting blood sugars have been less than 200, which he feels is good for him  The patient is being seen in follow-up of the below listed diagnoses  Encounter Diagnoses   Name Primary?  CAD S/P percutaneous coronary angioplasty Yes    History of anterior wall myocardial infarction     Essential hypertension     Class 2 obesity     Edema of both legs     Excessive weight gain     Dyslipidemia     Type 2 diabetes mellitus with hyperosmolarity without coma, with long-term current use of insulin (HCC)     Malaise and fatigue         Review of Systems    All other systems negative, except as noted in history of present illness    Historical Information   History reviewed  No pertinent past medical history  History reviewed   No pertinent surgical history  Social History     Substance and Sexual Activity   Alcohol Use Not Currently     Social History     Substance and Sexual Activity   Drug Use Not on file     Social History     Tobacco Use   Smoking Status Former Smoker   Smokeless Tobacco Never Used       Family History:  History reviewed  No pertinent family history  Meds/Allergies     Prior to Admission medications    Medication Sig Start Date End Date Taking?  Authorizing Provider   Accu-Chek Guide test strip  11/16/21  Yes Historical Provider, MD   aspirin (ECOTRIN LOW STRENGTH) 81 mg EC tablet Take 81 mg by mouth daily   Yes Historical Provider, MD   atorvastatin (LIPITOR) 80 mg tablet Take 1 tablet (80 mg total) by mouth daily 2/1/22  Yes Jeffy Santoyo MD   carvedilol (COREG) 6 25 mg tablet Take 1 tablet (6 25 mg total) by mouth 2 (two) times a day with meals 2/1/22  Yes Jeffy Santoyo MD   insulin aspart (NovoLOG) 100 units/mL injection Inject 50 Units under the skin 2 (two) times a day with meals   Yes Historical Provider, MD   lisinopril (ZESTRIL) 5 mg tablet Take 1 tablet (5 mg total) by mouth daily 2/1/22  Yes Jeffy Santoyo MD   spironolactone (ALDACTONE) 25 mg tablet Take 1 tablet (25 mg total) by mouth daily 2/1/22  Yes Jeffy Santoyo MD   atorvastatin (LIPITOR) 80 mg tablet Take 1 tablet by mouth daily 9/27/17 2/1/22 Yes Historical Provider, MD   carvedilol (COREG) 6 25 mg tablet Take 1 tablet (6 25 mg total) by mouth 2 (two) times a day with meals 1/24/22 2/1/22 Yes Jeffy Santoyo MD   lisinopril (ZESTRIL) 5 mg tablet Take 1 tablet by mouth once daily 8/17/21 2/1/22 Yes Jeffy Santoyo MD   spironolactone (ALDACTONE) 25 mg tablet Take 1 tablet by mouth once daily 8/17/21 2/1/22 Yes Jeffy Santoyo MD   ezetimibe (ZETIA) 10 mg tablet Take 1 tablet (10 mg total) by mouth daily 2/1/22   Jeffy Santoyo MD   furosemide (LASIX) 40 mg tablet Take 1 tablet (40 mg total) by mouth daily as needed (Leg swelling) 2/1/22   Jeffy Santoyo MD ezetimibe (ZETIA) 10 mg tablet Take 1 tablet (10 mg total) by mouth daily  Patient not taking: Reported on 2/1/2022 4/20/21 2/1/22  Shira Diaz MD   furosemide (LASIX) 80 mg tablet Take 80 mg by mouth daily as needed  Patient not taking: Reported on 2/1/2022 2/1/22  Historical Provider, MD   pioglitazone (ACTOS) 30 mg tablet  11/29/21 2/1/22  Historical Provider, MD       No Known Allergies      Vitals:    02/01/22 1359   BP: 120/80   BP Location: Right arm   Patient Position: Sitting   Cuff Size: Large   Pulse: 73   Temp: 97 9 °F (36 6 °C)   TempSrc: Temporal   SpO2: 97%   Weight: 119 kg (263 lb)   Height: 5' 11" (1 803 m)       Body mass index is 36 68 kg/m²  14 pound weight gain in approximately one year and 49 7 pound weight gain in approximately 4-1/2 years    Physical Exam:    General Appearance:  Alert, cooperative, no distress, appears stated age and is moderately to markedly obese  Head:  Normocephalic, without obvious abnormality, atraumatic   Eyes:  PERRL, conjunctiva/corneas clear, EOM's intact,   both eyes   Ears:  Normal TM's and external ear canals, both ears   Nose: Nares normal, septum midline, mucosa normal, no drainage or sinus tenderness   Throat: Lips, mucosa, and tongue normal; teeth and gums normal   Neck: Supple, symmetrical, trachea midline, no adenopathy, thyroid: not enlarged, symmetric, no tenderness/mass/nodules, no carotid bruit or JVD   Back:   Symmetric, no curvature, ROM normal, no CVA tenderness   Lungs:   Clear to auscultation bilaterally, respirations unlabored   Chest Wall:  No tenderness or deformity   Heart:  Regular rate and rhythm, S1, S2 normal, no murmur, rub or gallop   Abdomen:   Soft, non-tender, bowel sounds active all four quadrants,  no masses, no organomegaly  Marked abdominal obesity noted     Extremities: Extremities normal, atraumatic, no cyanosis with trace bilateral pretibial edema   Pulses: 2+ and symmetric   Skin: Skin showed normal color, texture, turgor and no rashes or lesions   Lymph nodes: Cervical, supraclavicular, and axillary nodes normal   Neurologic: Normal         Cardiographics    ECG 02/01/22:    Normal sinus rhythm at 73 bpm   Rightward frontal plane QRS axis   Otherwise normal ECG, unchanged from 02/11/2021    IMAGING:    No Chest XR results available for this patient      Renaldo Sandy nuclear stress study 04/12/2021:    No perfusion defects   No left ventricular regional wall motion abnormality with calculated LVEF of 62%  Normal study      LAB REVIEW:      Lab Results   Component Value Date    SODIUM 136 04/02/2021    K 4 6 04/02/2021     04/02/2021    CO2 28 04/02/2021    BUN 19 04/02/2021    CREATININE 0 92 04/02/2021    GLUF 236 (H) 04/02/2021    CALCIUM 10 3 (H) 04/02/2021    AST 16 04/02/2021    ALT 41 04/02/2021    ALKPHOS 99 04/02/2021    EGFR 87 04/02/2021     Lab Results   Component Value Date    CHOLESTEROL 192 04/02/2021     Lab Results   Component Value Date    HDL 56 04/02/2021       Lab Results   Component Value Date    LDLCALC 117 (H) 04/02/2021     No components found for: [place]  Lab Results   Component Value Date    TRIG 97 04/02/2021     Hemoglobin A1c and estimated average glucose 04/02/2021:  9 7% and 232  CBC 04/02/2021:  H/H 17 1/53 5 with normal WBC and platelets          Tiffanie Nunes MD

## 2022-02-01 NOTE — PATIENT INSTRUCTIONS
1  Because of complaints of weight gain, leg swelling, tiredness and lack of recent results, we have ordered a battery of blood tests, including CMP, lipid panel, TSH, NT-proBNP, hemoglobin A1c, CBC   2  We have also requested an echocardiogram or cardiac ultrasound to be done at [facility] by appointment to recheck overall heart function as a potential cause for the above complaints  However, this test order was canceled because of patient's concern about out of pocket cost   3  We will contact you with the results of all these tests as they become available  4  We renewed six prescriptions and sent them to [facility]  5  Follow-up in one year with EKG

## 2022-03-07 ENCOUNTER — APPOINTMENT (OUTPATIENT)
Dept: LAB | Facility: CLINIC | Age: 67
End: 2022-03-07
Payer: COMMERCIAL

## 2022-03-07 LAB
ALBUMIN SERPL BCP-MCNC: 3.4 G/DL (ref 3.5–5)
ALP SERPL-CCNC: 83 U/L (ref 46–116)
ALT SERPL W P-5'-P-CCNC: 52 U/L (ref 12–78)
ANION GAP SERPL CALCULATED.3IONS-SCNC: 3 MMOL/L (ref 4–13)
AST SERPL W P-5'-P-CCNC: 25 U/L (ref 5–45)
BILIRUB SERPL-MCNC: 0.63 MG/DL (ref 0.2–1)
BUN SERPL-MCNC: 25 MG/DL (ref 5–25)
CALCIUM ALBUM COR SERPL-MCNC: 10 MG/DL (ref 8.3–10.1)
CALCIUM SERPL-MCNC: 9.5 MG/DL (ref 8.3–10.1)
CHLORIDE SERPL-SCNC: 103 MMOL/L (ref 100–108)
CHOLEST SERPL-MCNC: 131 MG/DL
CO2 SERPL-SCNC: 29 MMOL/L (ref 21–32)
CREAT SERPL-MCNC: 0.91 MG/DL (ref 0.6–1.3)
ERYTHROCYTE [DISTWIDTH] IN BLOOD BY AUTOMATED COUNT: 13 % (ref 11.6–15.1)
EST. AVERAGE GLUCOSE BLD GHB EST-MCNC: 220 MG/DL
GFR SERPL CREATININE-BSD FRML MDRD: 87 ML/MIN/1.73SQ M
GLUCOSE P FAST SERPL-MCNC: 234 MG/DL (ref 65–99)
HBA1C MFR BLD: 9.3 %
HCT VFR BLD AUTO: 50.4 % (ref 36.5–49.3)
HDLC SERPL-MCNC: 44 MG/DL
HGB BLD-MCNC: 16.4 G/DL (ref 12–17)
LDLC SERPL CALC-MCNC: 72 MG/DL (ref 0–100)
MCH RBC QN AUTO: 29.7 PG (ref 26.8–34.3)
MCHC RBC AUTO-ENTMCNC: 32.5 G/DL (ref 31.4–37.4)
MCV RBC AUTO: 91 FL (ref 82–98)
NONHDLC SERPL-MCNC: 87 MG/DL
NT-PROBNP SERPL-MCNC: 19 PG/ML
PLATELET # BLD AUTO: 308 THOUSANDS/UL (ref 149–390)
PMV BLD AUTO: 9.8 FL (ref 8.9–12.7)
POTASSIUM SERPL-SCNC: 4.8 MMOL/L (ref 3.5–5.3)
PROT SERPL-MCNC: 7.3 G/DL (ref 6.4–8.2)
RBC # BLD AUTO: 5.52 MILLION/UL (ref 3.88–5.62)
SODIUM SERPL-SCNC: 135 MMOL/L (ref 136–145)
TRIGL SERPL-MCNC: 73 MG/DL
TSH SERPL DL<=0.05 MIU/L-ACNC: 1.91 UIU/ML (ref 0.36–3.74)
WBC # BLD AUTO: 9.72 THOUSAND/UL (ref 4.31–10.16)

## 2022-03-07 PROCEDURE — 85027 COMPLETE CBC AUTOMATED: CPT | Performed by: INTERNAL MEDICINE

## 2022-03-07 PROCEDURE — 83036 HEMOGLOBIN GLYCOSYLATED A1C: CPT | Performed by: INTERNAL MEDICINE

## 2022-03-07 PROCEDURE — 84443 ASSAY THYROID STIM HORMONE: CPT | Performed by: INTERNAL MEDICINE

## 2022-03-07 PROCEDURE — 36415 COLL VENOUS BLD VENIPUNCTURE: CPT | Performed by: INTERNAL MEDICINE

## 2022-03-07 PROCEDURE — 83880 ASSAY OF NATRIURETIC PEPTIDE: CPT | Performed by: INTERNAL MEDICINE

## 2022-03-07 PROCEDURE — 80053 COMPREHEN METABOLIC PANEL: CPT | Performed by: INTERNAL MEDICINE

## 2022-03-07 PROCEDURE — 80061 LIPID PANEL: CPT | Performed by: INTERNAL MEDICINE

## 2022-03-08 ENCOUNTER — TELEPHONE (OUTPATIENT)
Dept: CARDIOLOGY CLINIC | Facility: CLINIC | Age: 67
End: 2022-03-08

## 2022-03-08 NOTE — TELEPHONE ENCOUNTER
----- Message from Pete Beckham MD sent at 3/7/2022  8:19 PM EST -----  Notify patient that Dr Quan Brandt reviewed lab work and only significant abnormality was A1c of 9 3% and high blood sugar of 234  Glucose was similar to last year and is indicative of uncontrolled diabetes  CBC, lipid panel, NT BNP, TSH, and CMP were otherwise normal   He should have Dr Mauri Waggoner or whoever handles his diabetes mellitus review the diabetes situation with him  We will see him as previously indicated in my last note in about one year

## 2022-06-13 ENCOUNTER — OFFICE VISIT (OUTPATIENT)
Dept: FAMILY MEDICINE CLINIC | Facility: CLINIC | Age: 67
End: 2022-06-13
Payer: COMMERCIAL

## 2022-06-13 ENCOUNTER — TELEPHONE (OUTPATIENT)
Dept: GERIATRICS | Age: 67
End: 2022-06-13

## 2022-06-13 VITALS
WEIGHT: 248 LBS | BODY MASS INDEX: 34.72 KG/M2 | SYSTOLIC BLOOD PRESSURE: 128 MMHG | RESPIRATION RATE: 16 BRPM | DIASTOLIC BLOOD PRESSURE: 70 MMHG | HEIGHT: 71 IN | TEMPERATURE: 98 F | HEART RATE: 72 BPM

## 2022-06-13 DIAGNOSIS — Z00.00 MEDICARE ANNUAL WELLNESS VISIT, SUBSEQUENT: ICD-10-CM

## 2022-06-13 DIAGNOSIS — Z13.6 ENCOUNTER FOR ABDOMINAL AORTIC ANEURYSM (AAA) SCREENING: ICD-10-CM

## 2022-06-13 DIAGNOSIS — E11.40 TYPE 2 DIABETES MELLITUS WITH DIABETIC NEUROPATHY, WITH LONG-TERM CURRENT USE OF INSULIN (HCC): Primary | ICD-10-CM

## 2022-06-13 DIAGNOSIS — Z79.4 TYPE 2 DIABETES MELLITUS WITH DIABETIC NEUROPATHY, WITH LONG-TERM CURRENT USE OF INSULIN (HCC): Primary | ICD-10-CM

## 2022-06-13 DIAGNOSIS — I10 ESSENTIAL HYPERTENSION: ICD-10-CM

## 2022-06-13 DIAGNOSIS — F32.A DEPRESSION, UNSPECIFIED DEPRESSION TYPE: ICD-10-CM

## 2022-06-13 DIAGNOSIS — R41.3 MEMORY PROBLEM: ICD-10-CM

## 2022-06-13 DIAGNOSIS — I25.10 CORONARY ARTERY DISEASE INVOLVING NATIVE HEART WITHOUT ANGINA PECTORIS, UNSPECIFIED VESSEL OR LESION TYPE: ICD-10-CM

## 2022-06-13 PROBLEM — Z28.310 COVID-19 VACCINE SERIES DECLINED: Status: RESOLVED | Noted: 2022-02-01 | Resolved: 2022-06-13

## 2022-06-13 PROBLEM — I71.4: Status: RESOLVED | Noted: 2021-02-11 | Resolved: 2022-06-13

## 2022-06-13 PROBLEM — R63.5 EXCESSIVE WEIGHT GAIN: Status: RESOLVED | Noted: 2022-02-01 | Resolved: 2022-06-13

## 2022-06-13 PROBLEM — Z28.21 COVID-19 VACCINE SERIES DECLINED: Status: RESOLVED | Noted: 2022-02-01 | Resolved: 2022-06-13

## 2022-06-13 PROBLEM — I71.40 ABDOMINAL AORTIC ANEURYSM (AAA) GREATER THAN 39 MM IN DIAMETER: Status: RESOLVED | Noted: 2021-02-11 | Resolved: 2022-06-13

## 2022-06-13 PROBLEM — R53.83 MALAISE AND FATIGUE: Status: RESOLVED | Noted: 2022-02-01 | Resolved: 2022-06-13

## 2022-06-13 PROBLEM — R53.81 MALAISE AND FATIGUE: Status: RESOLVED | Noted: 2022-02-01 | Resolved: 2022-06-13

## 2022-06-13 LAB — SL AMB POCT HEMOGLOBIN AIC: 9.1 (ref ?–6.5)

## 2022-06-13 PROCEDURE — 1100F PTFALLS ASSESS-DOCD GE2>/YR: CPT | Performed by: FAMILY MEDICINE

## 2022-06-13 PROCEDURE — 3046F HEMOGLOBIN A1C LEVEL >9.0%: CPT | Performed by: FAMILY MEDICINE

## 2022-06-13 PROCEDURE — G0439 PPPS, SUBSEQ VISIT: HCPCS | Performed by: FAMILY MEDICINE

## 2022-06-13 PROCEDURE — 3078F DIAST BP <80 MM HG: CPT | Performed by: FAMILY MEDICINE

## 2022-06-13 PROCEDURE — 83036 HEMOGLOBIN GLYCOSYLATED A1C: CPT | Performed by: FAMILY MEDICINE

## 2022-06-13 PROCEDURE — 3008F BODY MASS INDEX DOCD: CPT | Performed by: FAMILY MEDICINE

## 2022-06-13 PROCEDURE — 1036F TOBACCO NON-USER: CPT | Performed by: FAMILY MEDICINE

## 2022-06-13 PROCEDURE — 3074F SYST BP LT 130 MM HG: CPT | Performed by: FAMILY MEDICINE

## 2022-06-13 PROCEDURE — 3288F FALL RISK ASSESSMENT DOCD: CPT | Performed by: FAMILY MEDICINE

## 2022-06-13 PROCEDURE — 1125F AMNT PAIN NOTED PAIN PRSNT: CPT | Performed by: FAMILY MEDICINE

## 2022-06-13 PROCEDURE — 1160F RVW MEDS BY RX/DR IN RCRD: CPT | Performed by: FAMILY MEDICINE

## 2022-06-13 PROCEDURE — 3725F SCREEN DEPRESSION PERFORMED: CPT | Performed by: FAMILY MEDICINE

## 2022-06-13 PROCEDURE — 1170F FXNL STATUS ASSESSED: CPT | Performed by: FAMILY MEDICINE

## 2022-06-13 PROCEDURE — 99203 OFFICE O/P NEW LOW 30 MIN: CPT | Performed by: FAMILY MEDICINE

## 2022-06-13 RX ORDER — INSULIN ASPART 100 [IU]/ML
50 INJECTION, SUSPENSION SUBCUTANEOUS
COMMUNITY

## 2022-06-13 RX ORDER — PHENTERMINE HYDROCHLORIDE 37.5 MG/1
37.5 TABLET ORAL DAILY
COMMUNITY
Start: 2022-05-20 | End: 2022-06-13

## 2022-06-13 RX ORDER — PREGABALIN 100 MG/1
100 CAPSULE ORAL 3 TIMES DAILY
Qty: 90 CAPSULE | Refills: 1 | Status: SHIPPED | OUTPATIENT
Start: 2022-06-13

## 2022-06-13 NOTE — PATIENT INSTRUCTIONS

## 2022-06-13 NOTE — PROGRESS NOTES
Assessment and Plan:     Problem List Items Addressed This Visit        Endocrine    Type 2 diabetes mellitus with diabetic neuropathy, with long-term current use of insulin (HCC) - Primary    Relevant Medications    insulin aspart protamine-insulin aspart (NovoLOG 70/30) 100 units/mL injection    metFORMIN (GLUCOPHAGE) 500 mg tablet    pregabalin (LYRICA) 100 mg capsule    Other Relevant Orders    POCT hemoglobin A1c (Completed)       Cardiovascular and Mediastinum    Coronary artery disease involving native heart without angina pectoris    Essential hypertension      Other Visit Diagnoses     Encounter for abdominal aortic aneurysm (AAA) screening        Relevant Orders     abdominal aorta screening aaa    Memory problem        Relevant Orders    Ambulatory Referral to Neurology    Depression, unspecified depression type        Relevant Orders    Ambulatory Referral to [address] [place] [address]           Preventive health issues were discussed with patient, and age appropriate screening tests were ordered as noted in patient's After Visit Summary  Personalized health advice and appropriate referrals for health education or preventive services given if needed, as noted in patient's After Visit Summary       History of Present Illness:     Patient presents for Medicare Annual Wellness visit    Patient Care Team:  Zuleyka Coles MD as PCP - General (Family Medicine)  MD Lyssa Saucedo MD     Problem List:     Patient Active Problem List   Diagnosis    Coronary artery disease involving native heart without angina pectoris    History of anterior wall myocardial infarction    Type 2 diabetes mellitus with diabetic neuropathy, with long-term current use of insulin ([address] )    Essential hypertension    Dyslipidemia    Abdominal aortic aneurysm (AAA) greater than 39 mm in diameter ([address] )    Class 1 obesity    Edema of both legs      Past Medical and Surgical History:     Past Medical History:   Diagnosis Date    Diabetes mellitus ([facility] )     Hypertension     Obesity      Past Surgical History:   Procedure Laterality Date    CORONARY ANGIOPLASTY WITH STENT PLACEMENT        Family History:     Family History   Problem Relation Age of Onset   [employer] Dementia Mother     Diabetes Father       Social History:     Social History     Socioeconomic History    Marital status: Single     Spouse name: None    Number of children: None    Years of education: None    Highest education level: None   Occupational History    None   Tobacco Use    Smoking status: Former Smoker    Smokeless tobacco: Never Used   Vaping Use    Vaping Use: Never used   Substance and Sexual Activity    Alcohol use: Not Currently    Drug use: Never    Sexual activity: None   Other Topics Concern    None   Social History Narrative    None     Social Determinants of Health     Financial Resource Strain: Not on file   Food Insecurity: Not on file   Transportation Needs: Not on file   Physical Activity: Not on file   Stress: Not on file   Social Connections: Not on file   Intimate Partner Violence: Not on file   Housing Stability: Not on file      Medications and Allergies:     Current Outpatient Medications   Medication Sig Dispense Refill    Accu-Chek Guide test strip       aspirin (ECOTRIN LOW STRENGTH) 81 mg EC tablet Take 81 mg by mouth daily      atorvastatin (LIPITOR) 80 mg tablet Take 1 tablet (80 mg total) by mouth daily 30 tablet 5    carvedilol (COREG) 6 25 mg tablet Take 1 tablet (6 25 mg total) by mouth 2 (two) times a day with meals 60 tablet 5    ezetimibe (ZETIA) 10 mg tablet Take 1 tablet (10 mg total) by mouth daily 30 tablet 5    furosemide (LASIX) 40 mg tablet Take 1 tablet (40 mg total) by mouth daily as needed (Leg swelling) 30 tablet 5    insulin aspart (NovoLOG) 100 units/mL injection Inject 50 Units under the skin 2 (two) times a day with meals      insulin aspart protamine-insulin aspart (NovoLOG 70/30) 100 units/mL injection Inject 50 Units under the skin 2 (two) times a day before meals      lisinopril (ZESTRIL) 5 mg tablet Take 1 tablet (5 mg total) by mouth daily 30 tablet 5    metFORMIN (GLUCOPHAGE) 500 mg tablet Take 1 tablet (500 mg total) by mouth 2 (two) times a day with meals 180 tablet 3    pregabalin (LYRICA) 100 mg capsule Take 1 capsule (100 mg total) by mouth 3 (three) times a day 90 capsule 1    spironolactone (ALDACTONE) 25 mg tablet Take 1 tablet (25 mg total) by mouth daily 30 tablet 5     No current facility-administered medications for this visit  No Known Allergies   Immunizations:     Immunization History   Administered Date(s) Administered    Influenza, seasonal, injectable 10/19/2006, 12/08/2008    Pneumococcal Conjugate PCV 7 12/08/2008      Health Maintenance:         Topic Date Due    Hepatitis C Screening  Never done    Colorectal Cancer Screening  Never done         Topic Date Due    COVID-19 Vaccine (1) Never done    Pneumococcal Vaccine: 65+ Years (1 - PCV) 04/06/1961    Influenza Vaccine (Season Ended) 09/01/2022      Medicare Health Risk Assessment:     /70 (BP Location: Left arm, Patient Position: Sitting, Cuff Size: Adult)   Pulse 72   Temp 98 °F (36 7 °C) (Temporal)   Resp 16   Ht 5' 11" (1 803 m)   Wt 112 kg (248 lb)   BMI 34 59 kg/m²      Maria Ines Vu is here for his Subsequent Wellness visit  Health Risk Assessment:   Patient rates overall health as fair  Patient feels that their physical health rating is slightly worse  Patient is dissatisfied with their life  Eyesight was rated as slightly worse  Hearing was rated as slightly worse  Patient feels that their emotional and mental health rating is slightly worse  Patients states they are sometimes angry  Patient states they are often unusually tired/fatigued  Pain experienced in the last 7 days has been a lot  Patient's pain rating has been 7/10   Patient states that he has experienced no weight loss or gain in last 6 months  Depression Screening:   PHQ-2 Score: 0      Fall Risk Screening: In the past year, patient has experienced: history of falling in past year    Number of falls: 2 or more  Injured during fall?: No    Feels unsteady when standing or walking?: No    Worried about falling?: No      Home Safety:  Patient has trouble with stairs inside or outside of their home  Patient has working smoke alarms and has working carbon monoxide detector  Home safety hazards include: none  Nutrition:   Current diet is Regular  Medications:   Patient is not currently taking any over-the-counter supplements  Patient is able to manage medications  Activities of Daily Living (ADLs)/Instrumental Activities of Daily Living (IADLs):   Walk and transfer into and out of bed and chair?: Yes  Dress and groom yourself?: Yes    Bathe or shower yourself?: Yes    Feed yourself?  Yes  Do your laundry/housekeeping?: Yes  Manage your money, pay your bills and track your expenses?: Yes  Make your own meals?: Yes    Do your own shopping?: Yes    Previous Hospitalizations:   Any hospitalizations or ED visits within the last 12 months?: No      Advance Care Planning:   Living will: No    Durable POA for healthcare: No    Advanced directive: No    Advanced directive counseling given: Yes      PREVENTIVE SCREENINGS      Cardiovascular Screening:    General: Screening Current      Diabetes Screening:     General: Screening Not Indicated and History Diabetes      Colorectal Cancer Screening:     General: Patient Declines      Prostate Cancer Screening:    General: Screening Not Indicated      Osteoporosis Screening:    General: Screening Not Indicated      Abdominal Aortic Aneurysm (AAA) Screening:    Risk factors include: age between 73-69 yo and tobacco use      Due for: Screening AAA Ultrasound      Lung Cancer Screening:     General: Screening Not Indicated      Hepatitis C Screening:    General: Screening Not Indicated    Screening, Brief Intervention, and Referral to Treatment (SBIRT)    Screening  Typical number of drinks in a day: 0  Typical number of drinks in a week: 0  Interpretation: Low risk drinking behavior      Single Item Drug Screening:  How often have you used an illegal drug (including marijuana) or a prescription medication for non-medical reasons in the past year? never    Single Item Drug Screen Score: 0  Interpretation: Negative screen for possible drug use disorder      Lani Becerra MD

## 2022-06-13 NOTE — PROGRESS NOTES
Chief Complaint   Patient presents with    Establish Care     New pt    Foot Pain     DM foot pain    Blood Pressure Check    Hypertension    Follow-up     Chronic conditions         Patient ID: Andriy Borja is a 79 y o  male  HPI  Pt is seeing as a new pt for f/u DM2 with neuropathy, HTN, CAD, Obesity -  Does not follow ADA diet -  No regular exercises, under cardiologist care for CAD -  Last Hg AIC 9 3 - as per chart h/o AAA -  Pt denies it  -  asymptomatic except for pain and numbness in both feet for years -  Tried Gabapentin but did not like med -  Could not afford Lyrica in the past     The following portions of the patient's history were reviewed and updated as appropriate: allergies, current medications, past family history, past medical history, past social history, past surgical history and problem list     Review of Systems   Constitutional: Negative  Respiratory: Negative  Cardiovascular: Negative  Gastrointestinal: Negative  Genitourinary: Negative  Musculoskeletal: Negative for arthralgias, gait problem and joint swelling  Skin: Negative  Neurological: Positive for numbness (with pain in both feet )  Negative for dizziness, weakness and headaches  Psychiatric/Behavioral: Positive for decreased concentration and dysphoric mood  Negative for suicidal ideas  The patient is nervous/anxious          Current Outpatient Medications   Medication Sig Dispense Refill    Accu-Chek Guide test strip       aspirin (ECOTRIN LOW STRENGTH) 81 mg EC tablet Take 81 mg by mouth daily      atorvastatin (LIPITOR) 80 mg tablet Take 1 tablet (80 mg total) by mouth daily 30 tablet 5    carvedilol (COREG) 6 25 mg tablet Take 1 tablet (6 25 mg total) by mouth 2 (two) times a day with meals 60 tablet 5    ezetimibe (ZETIA) 10 mg tablet Take 1 tablet (10 mg total) by mouth daily 30 tablet 5    furosemide (LASIX) 40 mg tablet Take 1 tablet (40 mg total) by mouth daily as needed (Leg swelling) 30 tablet 5    insulin aspart protamine-insulin aspart (NovoLOG 70/30) 100 units/mL injection Inject 50 Units under the skin 2 (two) times a day before meals      lisinopril (ZESTRIL) 5 mg tablet Take 1 tablet (5 mg total) by mouth daily 30 tablet 5    spironolactone (ALDACTONE) 25 mg tablet Take 1 tablet (25 mg total) by mouth daily 30 tablet 5     No current facility-administered medications for this visit  Objective:    /70 (BP Location: Left arm, Patient Position: Sitting, Cuff Size: Adult)   Pulse 72   Temp 98 °F (36 7 °C) (Temporal)   Resp 16   Ht 5' 11" (1 803 m)   Wt 112 kg (248 lb)   BMI 34 59 kg/m²        Physical Exam  Constitutional:       Appearance: He is obese  He is not ill-appearing  Cardiovascular:      Rate and Rhythm: Normal rate and regular rhythm  Pulses: Pulses are weak  Dorsalis pedis pulses are 1+ on the right side and 1+ on the left side  Posterior tibial pulses are 1+ on the right side and 1+ on the left side  Heart sounds: Murmur heard  Pulmonary:      Effort: Pulmonary effort is normal  No respiratory distress  Breath sounds: No wheezing, rhonchi or rales  Abdominal:      Palpations: Abdomen is soft  Tenderness: There is no abdominal tenderness  Comments: Obese    Musculoskeletal:      Right lower leg: No edema  Left lower leg: No edema  Feet:      Right foot:      Skin integrity: No ulcer, skin breakdown, erythema, warmth, callus or dry skin  Left foot:      Skin integrity: No ulcer, skin breakdown, erythema, warmth, callus or dry skin  Neurological:      General: No focal deficit present  Mental Status: He is alert and oriented to person, place, and time  Psychiatric:         Mood and Affect: Mood normal          Thought Content: Thought content normal          Judgment: Judgment normal        Diabetic Foot Exam    Patient's shoes and socks removed      Right Foot/Ankle   Right Foot Inspection  Skin Exam: skin normal and skin intact  No dry skin, no warmth, no callus, no erythema, no maceration, no abnormal color, no pre-ulcer, no ulcer and no callus  Toe Exam: ROM and strength within normal limits  Sensory   Proprioception: intact  Monofilament testing: absent    Vascular  The right DP pulse is 1+  The right PT pulse is 1+  Left Foot/Ankle  Left Foot Inspection  Skin Exam: skin normal and skin intact  No dry skin, no warmth, no erythema, no maceration, normal color, no pre-ulcer, no ulcer and no callus  Toe Exam: ROM and strength within normal limits  Sensory   Proprioception: intact  Monofilament testing: absent    Vascular  The left DP pulse is 1+  The left PT pulse is 1+  Assign Risk Category  No deformity present  Loss of protective sensation  Weak pulses  Risk: 2      Labs in chart were reviewed  Recent Results (from the past 672 hour(s))   POCT hemoglobin A1c    Collection Time: 06/13/22  2:25 PM   Result Value Ref Range    Hemoglobin A1C 9 1 (A) 6 5       Assessment/Plan:         Diagnoses and all orders for this visit:    Type 2 diabetes mellitus with diabetic neuropathy, with long-term current use of insulin (HCC)  -     metFORMIN (GLUCOPHAGE) 500 mg tablet; Take 1 tablet (500 mg total) by mouth 2 (two) times a day with meals  -     pregabalin (LYRICA) 100 mg capsule; Take 1 capsule (100 mg total) by mouth 3 (three) times a day  -     POCT hemoglobin A1c    Essential hypertension    Coronary artery disease involving native heart without angina pectoris, unspecified vessel or lesion type    Encounter for abdominal aortic aneurysm (AAA) screening  -     US abdominal aorta screening aaa; Future    Memory problem  -     Ambulatory Referral to Neurology; Future    Depression, unspecified depression type  -     Ambulatory Referral to [facility]; Future    Medicare annual wellness visit, subsequent    Other orders  -     Discontinue: phentermine (ADIPEX-P) 37 5 MG tablet;  Take 37 5 mg by mouth in the morning  -     insulin aspart protamine-insulin aspart (NovoLOG 70/30) 100 units/mL injection; Inject 50 Units under the skin 2 (two) times a day before meals        Declined pneumonia vaccine     BMI Counseling: Body mass index is 34 59 kg/m²  Discussed the patient's BMI with him  The BMI is above normal  Nutrition recommendations include reducing portion sizes, decreasing overall calorie intake, 3-5 servings of fruits/vegetables daily, reducing fast food intake and consuming healthier snacks  Exercise recommendations include exercising 3-5 times per week           rto in 3 m         Kiara Loja MD

## 2022-06-13 NOTE — TELEPHONE ENCOUNTER
[facility]  [address], [address]    [address] [place] [address],[address]  [place], [address] [place] [address] [place]    [phone]    Telephone Intake: Geriatric Assessment     Referral source: Ankit Keita MD to Neurology to [address] [place] [address] who is scheduling/relationship to patient: self  Caller's phone number: [phone]    Reason for referral: Patient concerns , Family member concerns and Provider concerns regarding memory concerns  What is the goal of the visit? initial assessment and diagnosis     Has the patient been seen by a Neurologist or Geriatrician? No  Has the patient ever been diagnosed with dementia? No, but mother has dementia      Preferred language? Eng[place]  Highest education level? High School Graduate  Does the patient wear glasses? Yes   Does the patient use hearing aids? No     Is there a living will/healthcare POA in place/If so, who? No    Does the pt/caregiver have access for a virtual visit (computer/smart phone with audio/video)? Yes     Caller was informed: Please make sure the pt is accompanied by someone who knows them well / caregiver / family member to participate in this appointment  Who will accompany the pt (name and relationship)? Dearl Han, Significant other [phone], or sister Larson   Phone number of person accompanying pt: [phone]  If a pt plans to attend alone, please route a message to the assigned provider for approval      Office packet mailed out to: [facility] [employer] [facility] [place] [facility] [phone]    Added to wait list for sooner appointments? Yes     NOTE FOR : If the pt was recently hospitalized, please route intake to assigned provider for chart review

## 2022-06-20 ENCOUNTER — TELEPHONE (OUTPATIENT)
Dept: FAMILY MEDICINE CLINIC | Facility: CLINIC | Age: 67
End: 2022-06-20

## 2022-07-07 ENCOUNTER — HOSPITAL ENCOUNTER (OUTPATIENT)
Dept: RADIOLOGY | Facility: HOSPITAL | Age: 67
Discharge: HOME | End: 2022-07-07
Attending: FAMILY MEDICINE
Payer: COMMERCIAL

## 2022-07-07 DIAGNOSIS — Z13.6 ENCOUNTER FOR ABDOMINAL AORTIC ANEURYSM (AAA) SCREENING: ICD-10-CM

## 2022-07-07 PROCEDURE — 76706 US ABDL AORTA SCREEN AAA: CPT

## 2022-07-11 ENCOUNTER — HOSPITAL ENCOUNTER (EMERGENCY)
Facility: HOSPITAL | Age: 67
Discharge: HOME | End: 2022-07-11
Attending: EMERGENCY MEDICINE
Payer: COMMERCIAL

## 2022-07-11 ENCOUNTER — TELEPHONE (OUTPATIENT)
Dept: FAMILY MEDICINE CLINIC | Facility: CLINIC | Age: 67
End: 2022-07-11

## 2022-07-11 VITALS
DIASTOLIC BLOOD PRESSURE: 69 MMHG | TEMPERATURE: 97.1 F | RESPIRATION RATE: 16 BRPM | HEART RATE: 75 BPM | OXYGEN SATURATION: 94 % | SYSTOLIC BLOOD PRESSURE: 127 MMHG

## 2022-07-11 DIAGNOSIS — S61.419A LACERATION OF DORSUM OF HAND: Primary | ICD-10-CM

## 2022-07-11 DIAGNOSIS — S61.419A HAND LACERATION: Primary | ICD-10-CM

## 2022-07-11 PROCEDURE — 99282 EMERGENCY DEPT VISIT SF MDM: CPT

## 2022-07-11 PROCEDURE — 99284 EMERGENCY DEPT VISIT MOD MDM: CPT | Performed by: PHYSICIAN ASSISTANT

## 2022-07-11 PROCEDURE — 90715 TDAP VACCINE 7 YRS/> IM: CPT | Performed by: PHYSICIAN ASSISTANT

## 2022-07-11 PROCEDURE — 90471 IMMUNIZATION ADMIN: CPT

## 2022-07-11 PROCEDURE — 12002 RPR S/N/AX/GEN/TRNK2.6-7.5CM: CPT | Performed by: PHYSICIAN ASSISTANT

## 2022-07-11 RX ORDER — CEFADROXIL 500 MG/1
500 CAPSULE ORAL EVERY 12 HOURS SCHEDULED
Qty: 14 CAPSULE | Refills: 0 | Status: SHIPPED | OUTPATIENT
Start: 2022-07-11 | End: 2022-07-18

## 2022-07-11 RX ORDER — GINSENG 100 MG
1 CAPSULE ORAL ONCE
Status: COMPLETED | OUTPATIENT
Start: 2022-07-11 | End: 2022-07-11

## 2022-07-11 RX ORDER — LIDOCAINE HYDROCHLORIDE AND EPINEPHRINE 10; 10 MG/ML; UG/ML
10 INJECTION, SOLUTION INFILTRATION; PERINEURAL ONCE
Status: COMPLETED | OUTPATIENT
Start: 2022-07-11 | End: 2022-07-11

## 2022-07-11 RX ADMIN — BACITRACIN ZINC 1 SMALL APPLICATION: 500 OINTMENT TOPICAL at 15:46

## 2022-07-11 RX ADMIN — LIDOCAINE HYDROCHLORIDE,EPINEPHRINE BITARTRATE 10 ML: 10; .01 INJECTION, SOLUTION INFILTRATION; PERINEURAL at 15:46

## 2022-07-11 RX ADMIN — TETANUS TOXOID, REDUCED DIPHTHERIA TOXOID AND ACELLULAR PERTUSSIS VACCINE, ADSORBED 0.5 ML: 5; 2.5; 8; 8; 2.5 SUSPENSION INTRAMUSCULAR at 15:46

## 2022-07-11 NOTE — ED PROVIDER NOTES
History  Chief Complaint   Patient presents with    Hand Laceration     Sent from [facility] for repair of laceration right second MCP  region, cut while washing a cup at 1030 today  Dressing in place from [facility]     80-year-old male presents today with a right hand laceration that occurred approximately 5-6 hours prior to arrival   Relates that he was washing dishes when a glass broke and sliced his hand over the 2nd MCP joint  Patient does not recall last tetanus vaccination  Denies numbness, paresthesias, weakness  Prior to Admission Medications   Prescriptions Last Dose Informant Patient Reported? Taking?    Accu-Chek Guide test strip  Self Yes No   aspirin (ECOTRIN LOW STRENGTH) 81 mg EC tablet  Self Yes No   Sig: Take 81 mg by mouth daily   atorvastatin (LIPITOR) 80 mg tablet   No No   Sig: Take 1 tablet (80 mg total) by mouth daily   carvedilol (COREG) 6 25 mg tablet   No No   Sig: Take 1 tablet (6 25 mg total) by mouth 2 (two) times a day with meals   ezetimibe (ZETIA) 10 mg tablet   No No   Sig: Take 1 tablet (10 mg total) by mouth daily   furosemide (LASIX) 40 mg tablet   No No   Sig: Take 1 tablet (40 mg total) by mouth daily as needed (Leg swelling)   insulin aspart protamine-insulin aspart (NovoLOG 70/30) 100 units/mL injection   Yes No   Sig: Inject 50 Units under the skin 2 (two) times a day before meals   lisinopril (ZESTRIL) 5 mg tablet   No No   Sig: Take 1 tablet (5 mg total) by mouth daily   metFORMIN (GLUCOPHAGE) 500 mg tablet   No No   Sig: Take 1 tablet (500 mg total) by mouth 2 (two) times a day with meals   pregabalin (LYRICA) 100 mg capsule   No No   Sig: Take 1 capsule (100 mg total) by mouth 3 (three) times a day   spironolactone (ALDACTONE) 25 mg tablet   No No   Sig: Take 1 tablet (25 mg total) by mouth daily      Facility-Administered Medications: None       Past Medical History:   Diagnosis Date    Diabetes mellitus ([address] )     Hypertension     Obesity Past Surgical History:   Procedure Laterality Date    CORONARY ANGIOPLASTY WITH STENT PLACEMENT      ROTATOR CUFF REPAIR Right        Family History   Problem Relation Age of Onset    Dementia Mother     Diabetes Father      I have reviewed and agree with the history as documented  E-Cigarette/Vaping    E-Cigarette Use Never User      E-Cigarette/Vaping Substances     Social History     Tobacco Use    Smoking status: Former Smoker    Smokeless tobacco: Never Used   Vaping Use    Vaping Use: Never used   Substance Use Topics    Alcohol use: Not Currently    Drug use: Never       Review of Systems   Constitutional: Negative  Negative for chills, fatigue and fever  HENT: Negative  Negative for congestion, postnasal drip, rhinorrhea and sore throat  Eyes: Negative  Respiratory: Negative  Negative for cough, shortness of breath and wheezing  Cardiovascular: Negative  Gastrointestinal: Negative  Negative for abdominal pain, diarrhea, nausea and vomiting  Endocrine: Negative  Genitourinary: Negative  Musculoskeletal: Negative  Skin: Positive for wound  Negative for color change, pallor and rash  Neurological: Negative  Hematological: Negative  Psychiatric/Behavioral: Negative  All other systems reviewed and are negative  Physical Exam  Physical Exam  Vitals and nursing note reviewed  Constitutional:       Appearance: Normal appearance  HENT:      Head: Normocephalic and atraumatic  Right Ear: External ear normal       Left Ear: External ear normal       Nose: Nose normal    Eyes:      Conjunctiva/sclera: Conjunctivae normal    Cardiovascular:      Rate and Rhythm: Normal rate  Pulses: Normal pulses  Pulmonary:      Effort: Pulmonary effort is normal    Abdominal:      General: There is no distension  Musculoskeletal:         General: No deformity  Normal range of motion  Cervical back: Normal range of motion     Skin:     General: Skin is warm and dry  Capillary Refill: Capillary refill takes less than 2 seconds  Findings: No rash  Neurological:      General: No focal deficit present  Mental Status: He is alert and oriented to person, place, and time  Mental status is at baseline  Psychiatric:         Mood and Affect: Mood normal          Behavior: Behavior normal          Thought Content: Thought content normal          Judgment: Judgment normal          Vital Signs  ED Triage Vitals [07/11/22 1535]   Temperature Pulse Respirations Blood Pressure SpO2   (!) 97 1 °F (36 2 °C) 75 16 127/69 94 %      Temp Source Heart Rate Source Patient Position - Orthostatic VS BP Location FiO2 (%)   Tympanic Monitor Sitting Left arm --      Pain Score       5           Vitals:    07/11/22 1535   BP: 127/69   Pulse: 75   Patient Position - Orthostatic VS: Sitting         Visual Acuity      ED Medications  Medications   tetanus-diphtheria-acellular pertussis (BOOSTRIX) IM injection 0 5 mL (0 5 mL Intramuscular Given 7/11/22 1546)   lidocaine-epinephrine (XYLOCAINE/EPINEPHRINE) 1 %-1:100,000 injection 10 mL (10 mL Infiltration Given 7/11/22 1546)   bacitracin topical ointment 1 small application (1 small application Topical Given 7/11/22 1546)       Diagnostic Studies  Results Reviewed     None                 No orders to display              Procedures  Laceration repair    Date/Time: 7/11/2022 4:12 PM  Performed by: Micki Lucio PA-C  Authorized by: Micki Lucio PA-C   Consent: Verbal consent obtained    Risks and benefits: risks, benefits and alternatives were discussed  Consent given by: patient  Patient understanding: patient states understanding of the procedure being performed  Patient consent: the patient's understanding of the procedure matches consent given  Procedure consent: procedure consent matches procedure scheduled  Relevant documents: relevant documents present and verified  Test results: test results available and properly labeled  Site marked: the operative site was marked  Radiology Images displayed and confirmed  If images not available, report reviewed: imaging studies available  Required items: required blood products, implants, devices, and special equipment available  Patient identity confirmed: verbally with patient  Time out: Immediately prior to procedure a "time out" was called to verify the correct patient, procedure, equipment, support staff and site/side marked as required  Body area: upper extremity  Location details: right hand  Laceration length: 4 cm  Foreign bodies: no foreign bodies  Tendon involvement: none  Nerve involvement: none  Anesthesia: local infiltration    Anesthesia:  Local Anesthetic: lidocaine 1% with epinephrine      Procedure Details:  Preparation: Patient was prepped and draped in the usual sterile fashion  Irrigation solution: saline  Irrigation method: syringe  Amount of cleaning: extensive  Debridement: none  Degree of undermining: none  Skin closure: 5-0 nylon  Number of sutures: 10  Technique: simple  Approximation: close  Approximation difficulty: simple  Dressing: 4x4 sterile gauze, antibiotic ointment and gauze roll  Patient tolerance: patient tolerated the procedure well with no immediate complications               ED Course                               SBIRT 20yo+    Flowsheet Row Most Recent Value   SBIRT (23 yo +)    In order to provide better care to our patients, we are screening all of our patients for alcohol and drug use  Would it be okay to ask you these screening questions? No Filed at: 07/11/2022 1551                    [facility] [place]  Number of Diagnoses or Management Options  Hand laceration  Diagnosis management comments: Patient updated on tetanus vaccination  Patient has no tendon lacerations however there is tendon exposure    He does have history of diabetes, given also time frame as well as these other factors will start patient on a prophylactic course of antibiotics  Will have patient return in 10 days for suture removal or sooner for any signs of infection  Patient given education regarding wound care as well as poor location of wound which is at risk for splitting open, patient also understands that this may serve as a skin graft as the small pedicle may result in later skin completely avulsing  Patient verbalizes understanding and agrees with the above assessment plan  Amount and/or Complexity of Data Reviewed  Review and summarize past medical records: yes  Independent visualization of images, tracings, or specimens: yes        Disposition  Final diagnoses:   Hand laceration     Time reflects when diagnosis was documented in both MDM as applicable and the Disposition within this note     Time User Action Codes Description Comment    7/11/2022  4:08 PM [address], [address] Hand laceration       ED Disposition     ED Disposition   Discharge    Condition   Stable    Date/Time   Mon Jul 11, 2022  4:08 PM    Comment   Alejandra Soulier Darois discharge to home/self care  Follow-up Information     Follow up With Specialties Details Why Contact Info Additional [address] Emergency Department Emergency Medicine Go in 10 days For suture removal, or sooner for any signs of infection [address] [address] [address] [address] Emergency Department, [facility] [place], [address]          Patient's Medications   Discharge Prescriptions    CEFADROXIL (DURICEF) 500 MG CAPSULE    Take 1 capsule (500 mg total) by mouth every 12 (twelve) hours for 7 days       Start Date: 7/11/2022 End Date: 7/18/2022       Order Dose: 500 mg       Quantity: 14 capsule    Refills: 0       No discharge procedures on file      PDMP Review     None          ED Provider  Electronically Signed by           Elsy Velasquez PA-C  07/11/22 8143

## 2022-07-18 ENCOUNTER — TELEPHONE (OUTPATIENT)
Dept: GERIATRICS | Age: 67
End: 2022-07-18

## 2022-07-18 NOTE — TELEPHONE ENCOUNTER
Called and left message for Bharat Simin to return call regarding his Geriatric Assessment that is scheduled on 10/17/22 with Dr Earnest Ramirez  I stated that we need to reschedule his appointment  I extended an earlier appointment, one that is on hold for Dr Asya zapata

## 2022-07-22 ENCOUNTER — RA CDI HCC (OUTPATIENT)
Dept: OTHER | Facility: HOSPITAL | Age: 67
End: 2022-07-22

## 2022-07-22 NOTE — PROGRESS NOTES
[address]  coding opportunities          Chart Reviewed number of suggestions sent to Provider: 1  E11 [id number]     Patients Insurance     Medicare Insurance: [facility] Advantage

## 2022-07-25 ENCOUNTER — OFFICE VISIT (OUTPATIENT)
Dept: FAMILY MEDICINE CLINIC | Facility: CLINIC | Age: 67
End: 2022-07-25
Payer: COMMERCIAL

## 2022-07-25 VITALS
OXYGEN SATURATION: 96 % | TEMPERATURE: 95.8 F | HEART RATE: 88 BPM | WEIGHT: 254 LBS | HEIGHT: 71 IN | BODY MASS INDEX: 35.56 KG/M2 | RESPIRATION RATE: 16 BRPM | DIASTOLIC BLOOD PRESSURE: 60 MMHG | SYSTOLIC BLOOD PRESSURE: 120 MMHG

## 2022-07-25 DIAGNOSIS — Z48.02 VISIT FOR SUTURE REMOVAL: Primary | ICD-10-CM

## 2022-07-25 PROCEDURE — 3074F SYST BP LT 130 MM HG: CPT | Performed by: FAMILY MEDICINE

## 2022-07-25 PROCEDURE — 1160F RVW MEDS BY RX/DR IN RCRD: CPT | Performed by: FAMILY MEDICINE

## 2022-07-25 PROCEDURE — 3078F DIAST BP <80 MM HG: CPT | Performed by: FAMILY MEDICINE

## 2022-07-25 PROCEDURE — 99212 OFFICE O/P EST SF 10 MIN: CPT | Performed by: FAMILY MEDICINE

## 2022-07-25 NOTE — PROGRESS NOTES
Chief Complaint   Patient presents with    Suture / Staple Removal        Patient ID: Zainab Espinosa is a 79 y o  male  HPI  Pt is seeing for suture removal  -  Sustained R hand laceration on 7/11 - was repaired by ER -  Healing well     The following portions of the patient's history were reviewed and updated as appropriate: allergies, current medications, past family history, past medical history, past social history, past surgical history and problem list     Review of Systems    Current Outpatient Medications   Medication Sig Dispense Refill    Accu-Chek Guide test strip       aspirin (ECOTRIN LOW STRENGTH) 81 mg EC tablet Take 81 mg by mouth daily      atorvastatin (LIPITOR) 80 mg tablet Take 1 tablet (80 mg total) by mouth daily 30 tablet 5    carvedilol (COREG) 6 25 mg tablet Take 1 tablet (6 25 mg total) by mouth 2 (two) times a day with meals 60 tablet 5    ezetimibe (ZETIA) 10 mg tablet Take 1 tablet (10 mg total) by mouth daily 30 tablet 5    furosemide (LASIX) 40 mg tablet Take 1 tablet (40 mg total) by mouth daily as needed (Leg swelling) 30 tablet 5    insulin aspart protamine-insulin aspart (NovoLOG 70/30) 100 units/mL injection Inject 50 Units under the skin 2 (two) times a day before meals      lisinopril (ZESTRIL) 5 mg tablet Take 1 tablet (5 mg total) by mouth daily 30 tablet 5    metFORMIN (GLUCOPHAGE) 500 mg tablet Take 1 tablet (500 mg total) by mouth 2 (two) times a day with meals 180 tablet 3    pregabalin (LYRICA) 100 mg capsule Take 1 capsule (100 mg total) by mouth 3 (three) times a day 90 capsule 1    spironolactone (ALDACTONE) 25 mg tablet Take 1 tablet (25 mg total) by mouth daily 30 tablet 5     No current facility-administered medications for this visit         Objective:    /60 (BP Location: Left arm, Patient Position: Sitting, Cuff Size: Large)   Pulse 88   Temp (!) 95 8 °F (35 4 °C)   Resp 16   Ht 5' 11" (1 803 m)   Wt 115 kg (254 lb)   SpO2 96%   BMI 35 43 kg/m²        Physical Exam  Skin:     Findings: Laceration (R hand at the 2nd MCP area -  sutures are intact, healing well ) present                   Assessment/Plan:         Diagnoses and all orders for this visit:    Visit for suture removal      suture were removed    Steri strips applied   rto prn                     Tressa Shepherd MD

## 2022-07-27 ENCOUNTER — TELEPHONE (OUTPATIENT)
Dept: FAMILY MEDICINE CLINIC | Facility: CLINIC | Age: 67
End: 2022-07-27

## 2022-07-27 NOTE — TELEPHONE ENCOUNTER
Called and left message for Harry Medina stating that we need to reschedule is consult and care conference  Instructed pt to call office to reschedule appointments

## 2022-07-29 NOTE — TELEPHONE ENCOUNTER
Spoke with patient's friend, Lloyd Neri ([phone]) to reschedule appointments  Darshana Mohan accepted 8/17/22 for Consult at [place] office, and 9/23/22 for Virtual Care Conference on the [facility]

## 2022-08-17 ENCOUNTER — APPOINTMENT (OUTPATIENT)
Dept: LAB | Facility: HOSPITAL | Age: 67
End: 2022-08-17
Attending: STUDENT IN AN ORGANIZED HEALTH CARE EDUCATION/TRAINING PROGRAM
Payer: COMMERCIAL

## 2022-08-17 ENCOUNTER — CONSULT (OUTPATIENT)
Dept: GERIATRICS | Age: 67
End: 2022-08-17
Payer: COMMERCIAL

## 2022-08-17 VITALS
TEMPERATURE: 97.3 F | WEIGHT: 251.2 LBS | HEIGHT: 71 IN | BODY MASS INDEX: 35.17 KG/M2 | DIASTOLIC BLOOD PRESSURE: 70 MMHG | RESPIRATION RATE: 18 BRPM | SYSTOLIC BLOOD PRESSURE: 120 MMHG | OXYGEN SATURATION: 97 % | HEART RATE: 65 BPM

## 2022-08-17 DIAGNOSIS — R26.81 GAIT INSTABILITY: ICD-10-CM

## 2022-08-17 DIAGNOSIS — E78.5 DYSLIPIDEMIA: ICD-10-CM

## 2022-08-17 DIAGNOSIS — I25.10 CORONARY ARTERY DISEASE INVOLVING NATIVE HEART WITHOUT ANGINA PECTORIS, UNSPECIFIED VESSEL OR LESION TYPE: ICD-10-CM

## 2022-08-17 DIAGNOSIS — E11.40 TYPE 2 DIABETES MELLITUS WITH DIABETIC NEUROPATHY, WITH LONG-TERM CURRENT USE OF INSULIN (HCC): ICD-10-CM

## 2022-08-17 DIAGNOSIS — R41.3 MEMORY PROBLEM: ICD-10-CM

## 2022-08-17 DIAGNOSIS — I10 ESSENTIAL HYPERTENSION: ICD-10-CM

## 2022-08-17 DIAGNOSIS — Z79.4 TYPE 2 DIABETES MELLITUS WITH DIABETIC NEUROPATHY, WITH LONG-TERM CURRENT USE OF INSULIN (HCC): ICD-10-CM

## 2022-08-17 DIAGNOSIS — G31.84 MCI (MILD COGNITIVE IMPAIRMENT): Primary | ICD-10-CM

## 2022-08-17 DIAGNOSIS — F11.11 HISTORY OF OPIOID ABUSE (HCC): ICD-10-CM

## 2022-08-17 DIAGNOSIS — F32.0 CURRENT MILD EPISODE OF MAJOR DEPRESSIVE DISORDER, UNSPECIFIED WHETHER RECURRENT (HCC): ICD-10-CM

## 2022-08-17 PROBLEM — F32.9 MAJOR DEPRESSIVE DISORDER: Status: ACTIVE | Noted: 2022-08-17

## 2022-08-17 PROCEDURE — 99205 OFFICE O/P NEW HI 60 MIN: CPT | Performed by: STUDENT IN AN ORGANIZED HEALTH CARE EDUCATION/TRAINING PROGRAM

## 2022-08-17 PROCEDURE — 3074F SYST BP LT 130 MM HG: CPT | Performed by: STUDENT IN AN ORGANIZED HEALTH CARE EDUCATION/TRAINING PROGRAM

## 2022-08-17 PROCEDURE — 80307 DRUG TEST PRSMV CHEM ANLYZR: CPT | Performed by: STUDENT IN AN ORGANIZED HEALTH CARE EDUCATION/TRAINING PROGRAM

## 2022-08-17 PROCEDURE — 3078F DIAST BP <80 MM HG: CPT | Performed by: STUDENT IN AN ORGANIZED HEALTH CARE EDUCATION/TRAINING PROGRAM

## 2022-08-17 RX ORDER — PREGABALIN 100 MG/1
100 CAPSULE ORAL 3 TIMES DAILY
Qty: 90 CAPSULE | Refills: 0 | Status: SHIPPED | OUTPATIENT
Start: 2022-08-17 | End: 2022-09-21
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
## 2022-08-17 NOTE — ASSESSMENT & PLAN NOTE
Patient has intractable chronic back pain and neuropathic pain from his diabetes  Patient is currently taking pregabalin, will consider adding SNRI ( duloxetine), also refer her for pain management and sports medicine     UDS ordered

## 2022-08-17 NOTE — ASSESSMENT & PLAN NOTE
Lab Results   Component Value Date    HGBA1C 9 1 (A) 06/13/2022   Uncontrolled type 2 diabetes mellitus  Patient is using mixed insulin 50 units b i d , he is also on metformin 500 mg 2 times a day   Patient is establishing care with a new primary care doctor, obviously patient needs better control of glucose level  Diet, and medication compliance can be a factor, also weight loss is highly recommended

## 2022-08-17 NOTE — ASSESSMENT & PLAN NOTE
Blood pressure in the office today was fairly good  Patient is currently on lisinopril 5 mg daily, spironolactone and furosemide along with carvedilol 6 25 mg b i d
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
## 2022-08-17 NOTE — ASSESSMENT & PLAN NOTE
Patient is on aspirin, high-dose atorvastatin and ezetimibe  Exercise and heart healthy diet highly recommended

## 2022-08-17 NOTE — ASSESSMENT & PLAN NOTE
No history of psychiatric admission  Wife thinks patient may be depressed, he does have chronic pain and opioid dependence  Patient is not currently following with any behavioral health specialist   Will order initial blood work and brain imaging and consider starting pharmacologic treatment and referral for CBT

## 2022-08-17 NOTE — PROGRESS NOTES
ASSESSMENT AND PLAN:  1  Memory problem  -     Ambulatory Referral to Neurology        Decision-making capacity: ***    Staging: ***    Medications Review: ***      HPI:    We had the pleasure of evaluating Galileo Wahl who is a 79 y o  male with hypertension, HLD, type 2 diabetes, CAD and forgetfulness amongst other medical conditions in Geriatric consultation today  Mr Hank Duque is in the office with his       HISTORY AS PER ***:    COGNITION:  Memory Issues noticed since {0 - 10:65604} {Time; day/wk/mo/yr(s):9020}    Memory affected: {ED  CD_MEMORY:91511}    Symptoms started: {DESC; QSMRW:23503}  Over time the memory has:  {progression:6498719985}  Memory issue(s) were noted by: {Patient/Family/Caregiver:133866}   Difficulty finding the right word while speaking: {Yes or No:70853}  Requires repeat information or asking the same question repeatedly: {Yes or No:74828}  Fluctuation in alertness: {Yes or No:68204}  Changes in mood or personality:{Yes or No:61114}  Current or previous treatment for depression or anxiety: {Yes or No:64549}    Family member with dementia and what type? {YES/NO:55583}  History of head trauma: {Yes or No:10309}  History of stroke: {Yes or No:32245}  History of alcohol or substance abuse: {Yes or No:15294}    FUNCTIONAL STATUS:  BADLs  Does patient require assistance with:  Bathing: {Yes or No:10481}  Dressing: {Yes or No:73512}  Transferring: {Yes or No:72161}  Continence: {Yes or No:14202}  Toileting: {Yes or No:54968}  Feeding: {Yes or No:37856}    IADLs  Dose patient require assistance with:  Telephone: {Yes or No:52232}  Shopping: {Yes or OJ:77898}  Food Preparation: {Yes or No:78458}  Housekeeping: {Yes or No:59067}  Laundry: {Yes or No:88840}  Transportation: {Yes or No:49650}  Medications: {Yes or No:22600}  Finances: {Yes or No:91525}    NEUROPSYCH SYMPTOMS:  Does patient get angry or hostile? Resist care from others?  {Yes or No:79158}  Does patient see or hear things that no one else can see or hear? {Yes or No:27828}  Does patient act impatient and cranky? Does mood frequently change for no apparent reason? {Yes or No:61394}  Does patient act suspicious without good reason (example: believes that others are stealing from him or her, or planning to harm him or her in some way)? {Yes or No:65388}  Does patient less interested in his or her usual activities or in the activities and plans of others? {Yes or No:49294}  Does patient have trouble sleeping at night? {Yes or No:82359}  Dose patient have abnormal movements while asleep?  {Yes or No:44504}    SAFETY:  Hearing and vision issue: {Yes or No:43218}  Any gait or balance disorder: {Yes or No:89162}  Uses: ***  Any falls in the last year: {Yes or No:71824}  Any history of wandering: {Yes or No:29827}  Are there firearms or guns in the home: {Yes or No:80836}  Does patient drive: {Yes or PD:42173}  Any driving accidents or citations in the last year: {Yes or No:82262}  Any concerns about patient's ability to drive: {Yes or WC:29668}    ACP REVIEW:  Does patient have POA: {Yes or No:34556}  Does patient have a Living will: {Yes or No:47827}  Any legal assistance needed for healthcare planning?: {Yes or No:99660}    ROS: Review of Systems    No Known Allergies    Medications:    Current Outpatient Medications on File Prior to Visit   Medication Sig Dispense Refill    Accu-Chek Guide test strip       aspirin (ECOTRIN LOW STRENGTH) 81 mg EC tablet Take 81 mg by mouth daily      atorvastatin (LIPITOR) 80 mg tablet Take 1 tablet (80 mg total) by mouth daily 30 tablet 5    carvedilol (COREG) 6 25 mg tablet Take 1 tablet (6 25 mg total) by mouth 2 (two) times a day with meals 60 tablet 5    ezetimibe (ZETIA) 10 mg tablet Take 1 tablet (10 mg total) by mouth daily 30 tablet 5    furosemide (LASIX) 40 mg tablet Take 1 tablet (40 mg total) by mouth daily as needed (Leg swelling) 30 tablet 5    insulin aspart protamine-insulin aspart (NovoLOG 70/30) 100 units/mL injection Inject 50 Units under the skin 2 (two) times a day before meals      lisinopril (ZESTRIL) 5 mg tablet Take 1 tablet (5 mg total) by mouth daily 30 tablet 5    metFORMIN (GLUCOPHAGE) 500 mg tablet Take 1 tablet (500 mg total) by mouth 2 (two) times a day with meals 180 tablet 3    pregabalin (LYRICA) 100 mg capsule Take 1 capsule (100 mg total) by mouth 3 (three) times a day 90 capsule 1    spironolactone (ALDACTONE) 25 mg tablet Take 1 tablet (25 mg total) by mouth daily 30 tablet 5     No current facility-administered medications on file prior to visit         History:  Past Medical History:   Diagnosis Date    Diabetes mellitus ([facility] [address] )     Hypertension     Obesity      Past Surgical History:   Procedure Laterality Date    CORONARY ANGIOPLASTY WITH STENT PLACEMENT      ROTATOR CUFF REPAIR Right      Family History   Problem Relation Age of Onset    Dementia Mother     Diabetes Father      Social History     Socioeconomic History    Marital status: Single     Spouse name: Not on file    Number of children: Not on file    Years of education: Not on file    Highest education level: Not on file   Occupational History    Not on file   Tobacco Use    Smoking status: Former Smoker    Smokeless tobacco: Never Used   Vaping Use    Vaping Use: Never used   Substance and Sexual Activity    Alcohol use: Not Currently    Drug use: Never    Sexual activity: Not on file   Other Topics Concern    Not on file   Social History Narrative    Not on file     Social Determinants of Health     Financial Resource Strain: Not on file   Food Insecurity: Not on file   Transportation Needs: Not on file   Physical Activity: Not on file   Stress: Not on file   Social Connections: Not on file   Intimate Partner Violence: Not on file   Housing Stability: Not on file     Past Surgical History:   Procedure Laterality Date    [address] [place] [address] REPAIR Right        OBJECTIVE:  There were no vitals filed for this visit  There is no height or weight on file to calculate BMI  Physical Exam    MoCA: ***/30      Labs & Imaging:  Lab Results   Component Value Date    WBC 9 72 03/07/2022    HGB 16 4 03/07/2022    HCT 50 4 (H) 03/07/2022    MCV 91 03/07/2022     03/07/2022     Lab Results   Component Value Date    SODIUM 135 (L) 03/07/2022    K 4 8 03/07/2022     03/07/2022    CO2 29 03/07/2022    AGAP 3 (L) 03/07/2022    BUN 25 03/07/2022    CREATININE 0 91 03/07/2022    GLUF 234 (H) 03/07/2022    CALCIUM 9 5 03/07/2022    AST 25 03/07/2022    ALT 52 03/07/2022    ALKPHOS 83 03/07/2022    TP 7 3 03/07/2022    TBILI 0 63 03/07/2022    EGFR 87 03/07/2022     Lab Results   Component Value Date    HGBA1C 9 1 (A) 06/13/2022     No results found for: Butch Daphne  Lab Results   Component Value Date    [id number] 1 910 03/07/2022     No results found for: RPR  No results found for: GCHE90OSLTTH, [id number]   No results found for this or any previous visit

## 2022-08-22 ENCOUNTER — APPOINTMENT (OUTPATIENT)
Dept: LAB | Facility: CLINIC | Age: 67
End: 2022-08-22
Payer: COMMERCIAL

## 2022-08-22 DIAGNOSIS — G31.84 MCI (MILD COGNITIVE IMPAIRMENT): ICD-10-CM

## 2022-08-22 LAB
FOLATE SERPL-MCNC: 8.6 NG/ML (ref 3.1–17.5)
VIT B12 SERPL-MCNC: 400 PG/ML (ref 100–900)

## 2022-08-22 PROCEDURE — 82607 VITAMIN B-12: CPT

## 2022-08-22 PROCEDURE — 36415 COLL VENOUS BLD VENIPUNCTURE: CPT

## 2022-08-22 PROCEDURE — 86592 SYPHILIS TEST NON-TREP QUAL: CPT

## 2022-08-22 PROCEDURE — 82746 ASSAY OF FOLIC ACID SERUM: CPT

## 2022-08-23 ENCOUNTER — TELEPHONE (OUTPATIENT)
Dept: FAMILY MEDICINE CLINIC | Facility: CLINIC | Age: 67
End: 2022-08-23

## 2022-08-23 LAB — RPR SER QL: NORMAL

## 2022-08-29 LAB
AMPHETAMINES UR QL SCN: NEGATIVE NG/ML
BARBITURATES UR QL SCN: NEGATIVE NG/ML
BENZODIAZ UR QL: NEGATIVE NG/ML
BZE UR QL: NEGATIVE NG/ML
CANNABINOIDS UR QL SCN: POSITIVE
METHADONE UR QL SCN: NEGATIVE NG/ML
OPIATES UR QL: NEGATIVE NG/ML
PCP UR QL: NEGATIVE NG/ML
PROPOXYPH UR QL SCN: NEGATIVE NG/ML

## 2022-09-02 ENCOUNTER — HOSPITAL ENCOUNTER (OUTPATIENT)
Dept: RADIOLOGY | Facility: HOSPITAL | Age: 67
Discharge: HOME | End: 2022-09-02
Payer: COMMERCIAL

## 2022-09-02 DIAGNOSIS — G31.84 MCI (MILD COGNITIVE IMPAIRMENT): ICD-10-CM

## 2022-09-02 PROCEDURE — 70551 MRI BRAIN STEM W/O DYE: CPT

## 2022-09-02 PROCEDURE — G1004 CDSM NDSC: HCPCS

## 2022-09-06 ENCOUNTER — OFFICE VISIT (OUTPATIENT)
Dept: GERIATRICS | Age: 67
End: 2022-09-06
Payer: COMMERCIAL

## 2022-09-06 DIAGNOSIS — Z79.4 TYPE 2 DIABETES MELLITUS WITH DIABETIC NEUROPATHY, WITH LONG-TERM CURRENT USE OF INSULIN (HCC): ICD-10-CM

## 2022-09-06 DIAGNOSIS — E11.40 TYPE 2 DIABETES MELLITUS WITH DIABETIC NEUROPATHY, WITH LONG-TERM CURRENT USE OF INSULIN (HCC): ICD-10-CM

## 2022-09-06 DIAGNOSIS — G31.84 MCI (MILD COGNITIVE IMPAIRMENT): ICD-10-CM

## 2022-09-06 PROCEDURE — 96138 PSYCL/NRPSYC TECH 1ST: CPT | Performed by: STUDENT IN AN ORGANIZED HEALTH CARE EDUCATION/TRAINING PROGRAM

## 2022-09-06 PROCEDURE — 4010F ACE/ARB THERAPY RXD/TAKEN: CPT | Performed by: FAMILY MEDICINE

## 2022-09-06 NOTE — PROGRESS NOTES
[address]  [phone]    Mame Farmer was here today for Neurotrax comprehensive test  It took the patient 49 minutes to complete

## 2022-09-13 ENCOUNTER — TELEPHONE (OUTPATIENT)
Dept: GERIATRICS | Age: 67
End: 2022-09-13

## 2022-09-13 NOTE — TELEPHONE ENCOUNTER
Left message from patient stating that we received his MyChart message regarding his MRI Results  I let him know that those results will be addressed at his care conference  I also provided him with the date of the care conference  I instructed patient to call the office back if he has any questions  If the patient calls back, please relay Dr Donell De León message below

## 2022-09-14 ENCOUNTER — OFFICE VISIT (OUTPATIENT)
Dept: FAMILY MEDICINE CLINIC | Facility: CLINIC | Age: 67
End: 2022-09-14
Payer: COMMERCIAL

## 2022-09-14 ENCOUNTER — TELEPHONE (OUTPATIENT)
Dept: FAMILY MEDICINE CLINIC | Facility: CLINIC | Age: 67
End: 2022-09-14

## 2022-09-14 ENCOUNTER — LAB (OUTPATIENT)
Dept: LAB | Facility: CLINIC | Age: 67
End: 2022-09-14
Payer: COMMERCIAL

## 2022-09-14 VITALS
DIASTOLIC BLOOD PRESSURE: 68 MMHG | RESPIRATION RATE: 16 BRPM | WEIGHT: 247.2 LBS | TEMPERATURE: 97.7 F | SYSTOLIC BLOOD PRESSURE: 126 MMHG | HEART RATE: 74 BPM | BODY MASS INDEX: 34.61 KG/M2 | HEIGHT: 71 IN

## 2022-09-14 DIAGNOSIS — Z79.4 TYPE 2 DIABETES MELLITUS WITH DIABETIC NEUROPATHY, WITH LONG-TERM CURRENT USE OF INSULIN (HCC): Primary | ICD-10-CM

## 2022-09-14 DIAGNOSIS — E11.40 TYPE 2 DIABETES MELLITUS WITH DIABETIC NEUROPATHY, WITH LONG-TERM CURRENT USE OF INSULIN (HCC): Primary | ICD-10-CM

## 2022-09-14 DIAGNOSIS — R53.83 OTHER FATIGUE: ICD-10-CM

## 2022-09-14 PROBLEM — F32.9 MAJOR DEPRESSIVE DISORDER: Status: RESOLVED | Noted: 2022-08-17 | Resolved: 2022-09-14

## 2022-09-14 LAB
ALBUMIN SERPL BCP-MCNC: 3.5 G/DL (ref 3.5–5)
ALP SERPL-CCNC: 126 U/L (ref 46–116)
ALT SERPL W P-5'-P-CCNC: 54 U/L (ref 12–78)
ANION GAP SERPL CALCULATED.3IONS-SCNC: 6 MMOL/L (ref 4–13)
AST SERPL W P-5'-P-CCNC: 23 U/L (ref 5–45)
BILIRUB SERPL-MCNC: 0.72 MG/DL (ref 0.2–1)
BUN SERPL-MCNC: 13 MG/DL (ref 5–25)
CALCIUM SERPL-MCNC: 9.7 MG/DL (ref 8.3–10.1)
CHLORIDE SERPL-SCNC: 108 MMOL/L (ref 96–108)
CO2 SERPL-SCNC: 26 MMOL/L (ref 21–32)
CREAT SERPL-MCNC: 0.99 MG/DL (ref 0.6–1.3)
ERYTHROCYTE [DISTWIDTH] IN BLOOD BY AUTOMATED COUNT: 13.1 % (ref 11.6–15.1)
GFR SERPL CREATININE-BSD FRML MDRD: 78 ML/MIN/1.73SQ M
GLUCOSE P FAST SERPL-MCNC: 269 MG/DL (ref 65–99)
HCT VFR BLD AUTO: 48.7 % (ref 36.5–49.3)
HGB BLD-MCNC: 15.8 G/DL (ref 12–17)
MCH RBC QN AUTO: 29.8 PG (ref 26.8–34.3)
MCHC RBC AUTO-ENTMCNC: 32.4 G/DL (ref 31.4–37.4)
MCV RBC AUTO: 92 FL (ref 82–98)
PLATELET # BLD AUTO: 342 THOUSANDS/UL (ref 149–390)
PMV BLD AUTO: 10.1 FL (ref 8.9–12.7)
POTASSIUM SERPL-SCNC: 4.7 MMOL/L (ref 3.5–5.3)
PROT SERPL-MCNC: 7.1 G/DL (ref 6.4–8.4)
RBC # BLD AUTO: 5.3 MILLION/UL (ref 3.88–5.62)
SARS-COV-2 AG UPPER RESP QL IA: NEGATIVE
SL AMB POCT HEMOGLOBIN AIC: 8.7 (ref ?–6.5)
SODIUM SERPL-SCNC: 140 MMOL/L (ref 135–147)
VALID CONTROL: NORMAL
WBC # BLD AUTO: 8.82 THOUSAND/UL (ref 4.31–10.16)

## 2022-09-14 PROCEDURE — 85027 COMPLETE CBC AUTOMATED: CPT

## 2022-09-14 PROCEDURE — 87811 SARS-COV-2 COVID19 W/OPTIC: CPT | Performed by: FAMILY MEDICINE

## 2022-09-14 PROCEDURE — 83036 HEMOGLOBIN GLYCOSYLATED A1C: CPT | Performed by: FAMILY MEDICINE

## 2022-09-14 PROCEDURE — 3052F HG A1C>EQUAL 8.0%<EQUAL 9.0%: CPT | Performed by: FAMILY MEDICINE

## 2022-09-14 PROCEDURE — 80053 COMPREHEN METABOLIC PANEL: CPT

## 2022-09-14 PROCEDURE — 36415 COLL VENOUS BLD VENIPUNCTURE: CPT

## 2022-09-14 PROCEDURE — 99214 OFFICE O/P EST MOD 30 MIN: CPT | Performed by: FAMILY MEDICINE

## 2022-09-14 RX ORDER — GLIPIZIDE 5 MG/1
5 TABLET ORAL
Qty: 180 TABLET | Refills: 3 | Status: SHIPPED | OUTPATIENT
Start: 2022-09-14 | End: 2022-12-04 | Stop reason: SDUPTHER

## 2022-09-14 NOTE — TELEPHONE ENCOUNTER
Dr Henrietta Ham    Patient says Nikia Broderick med cost $300  Please send different med to [phone]

## 2022-09-14 NOTE — PROGRESS NOTES
Chief Complaint   Patient presents with    Fatigue     Pt c/o feeling very tired for a couple of days         Patient ID: Mame Farmer is a 79 y o  male  HPI  Pt is seeing for "felling tired more then usual for last 2 days"  -  FBG this am 249 -  Hg AIC 8 7 -  Minimally improved in 3 m  - no GI, , URI symptoms  -  Never had COVID vaccine -  No direct sick contacts     The following portions of the patient's history were reviewed and updated as appropriate: allergies, current medications, past family history, past medical history, past social history, past surgical history and problem list     Review of Systems   Constitutional: Positive for fatigue  Negative for activity change, appetite change and fever  HENT: Negative  Respiratory: Negative  Cardiovascular: Negative  Gastrointestinal: Negative  Genitourinary: Negative  Musculoskeletal: Negative  Skin: Negative  Neurological: Negative  Psychiatric/Behavioral: Negative          Current Outpatient Medications   Medication Sig Dispense Refill    Accu-Chek Guide test strip       aspirin (ECOTRIN LOW STRENGTH) 81 mg EC tablet Take 81 mg by mouth daily      atorvastatin (LIPITOR) 80 mg tablet Take 1 tablet (80 mg total) by mouth daily 180 tablet 3    carvedilol (COREG) 6 25 mg tablet Take 1 tablet (6 25 mg total) by mouth 2 (two) times a day with meals 60 tablet 5           ezetimibe (ZETIA) 10 mg tablet Take 1 tablet (10 mg total) by mouth daily 30 tablet 5    furosemide (LASIX) 40 mg tablet Take 1 tablet (40 mg total) by mouth daily as needed (Leg swelling) 30 tablet 5    insulin aspart protamine-insulin aspart (NovoLOG 70/30) 100 units/mL injection Inject 50 Units under the skin 2 (two) times a day before meals      lisinopril (ZESTRIL) 5 mg tablet Take 1 tablet (5 mg total) by mouth daily 30 tablet 5    metFORMIN (GLUCOPHAGE) 500 mg tablet Take 1 tablet (500 mg total) by mouth 2 (two) times a day with meals 180 tablet 0    pregabalin (LYRICA) 100 mg capsule Take 1 capsule (100 mg total) by mouth 3 (three) times a day 90 capsule 0    spironolactone (ALDACTONE) 25 mg tablet Take 1 tablet (25 mg total) by mouth daily 30 tablet 5     No current facility-administered medications for this visit  Objective:    /68 (BP Location: Left arm, Patient Position: Sitting, Cuff Size: Large)   Pulse 74   Temp 97 7 °F (36 5 °C)   Resp 16   Ht 5' 11 25" (1 81 m)   Wt 112 kg (247 lb 3 2 oz)   BMI 34 24 kg/m²        Physical Exam  Constitutional:       General: He is not in acute distress  Appearance: He is obese  He is not ill-appearing  Cardiovascular:      Rate and Rhythm: Normal rate and regular rhythm  Pulmonary:      Effort: Pulmonary effort is normal  No respiratory distress  Breath sounds: No wheezing, rhonchi or rales  Abdominal:      Palpations: Abdomen is soft  Tenderness: There is no abdominal tenderness  Musculoskeletal:      Right lower leg: No edema  Left lower leg: No edema  Neurological:      General: No focal deficit present  Mental Status: He is alert and oriented to person, place, and time  Labs in chart were reviewed    Recent Results (from the past 672 hour(s))   Vitamin B12    Collection Time: 08/22/22 10:36 AM   Result Value Ref Range    Vitamin B-12 400 100 - 900 pg/mL   Folate    Collection Time: 08/22/22 10:36 AM   Result Value Ref Range    Folate 8 6 3 1 - 17 5 ng/mL   RPR    Collection Time: 08/22/22 10:36 AM   Result Value Ref Range    RPR Non-Reactive Non-Reactive   CBC    Collection Time: 09/14/22  9:24 AM   Result Value Ref Range    WBC 8 82 4 31 - 10 16 Thousand/uL    RBC 5 30 3 88 - 5 62 Million/uL    Hemoglobin 15 8 12 0 - 17 0 g/dL    Hematocrit 48 7 36 5 - 49 3 %    MCV 92 82 - 98 fL    MCH 29 8 26 8 - 34 3 pg    MCHC 32 4 31 4 - 37 4 g/dL    RDW 13 1 11 6 - 15 1 %    Platelets 220 876 - 701 Thousands/uL    MPV 10 1 8 9 - 12 7 fL   Comprehensive metabolic panel    Collection Time: 09/14/22  9:24 AM   Result Value Ref Range    Sodium 140 135 - 147 mmol/L    Potassium 4 7 3 5 - 5 3 mmol/L    Chloride 108 96 - 108 mmol/L    CO2 26 21 - 32 mmol/L    ANION GAP 6 4 - 13 mmol/L    BUN 13 5 - 25 mg/dL    Creatinine 0 99 0 60 - 1 30 mg/dL    Glucose, Fasting 269 (H) 65 - 99 mg/dL    Calcium 9 7 8 3 - 10 1 mg/dL    AST 23 5 - 45 U/L    ALT 54 12 - 78 U/L    Alkaline Phosphatase 126 (H) 46 - 116 U/L    Total Protein 7 1 6 4 - 8 4 g/dL    Albumin 3 5 3 5 - 5 0 g/dL    Total Bilirubin 0 72 0 20 - 1 00 mg/dL    eGFR 78 ml/min/1 73sq m   POCT Rapid Covid Ag    Collection Time: 09/14/22  9:31 AM   Result Value Ref Range    POCT SARS-CoV-2 Ag Negative Negative    VALID CONTROL Valid    POCT hemoglobin A1c    Collection Time: 09/14/22  9:32 AM   Result Value Ref Range    Hemoglobin A1C 8 7 (A) 6 5       Assessment/Plan:         Diagnoses and all orders for this visit:    Type 2 diabetes mellitus with diabetic neuropathy, with long-term current use of insulin ([facility])  -     POCT hemoglobin A1c  -     dapagliflozin (Farxiga) 10 MG tablet; Take 1 tablet (10 mg total) by mouth daily  -     Ambulatory Referral to Endocrinology; Future    Other fatigue  -     POCT Rapid Covid Ag  -     CBC; Future  -     Comprehensive metabolic panel;  Future      rto in 3 m                 Sohail Grant MD

## 2022-09-14 NOTE — TELEPHONE ENCOUNTER
PHYSICIAN NEXT STEPS:   Review Only      CHIEF COMPLAINT:   Chief Complaint/Protocol Used: PCP Call - No Triage   Onset: Yesterday         ASSESSMENT:   Â» Onset: Yesterday   Â» Normal True   Â» Normal, no trouble breathing True   Â» Reason For Call Or Question: Fast pulse yesterday-not today- No symptoms today-requests an appointment   Â» Caller: Patient   -------------------------------------------------------      DISPOSITION:   Disposition Recommendation: See PCP When Office is Open (within 3 days)   Questions that led to disposition:   Â» Caller requesting an appointment, triage offered and declined   Patient Directed To: Unspecified   Patient Intended Action: Seek care in the doctor's office          CALL NOTES:   12/26/2017 at 12:50 PM by Florecita RADFORD» Appointment made for patient at [facility] with Dr. Marcelino on 11/28/17 at 900am. [facility] in [place] for tomorrow cancelled-out of scope. Patient advised to call back if symptoms worsen and patient agreed.      Â» Fast pulse-yesterday-150 for \"a few hours\", pulse today was 71. PCP-Юлия/Mayuri      DISPOSITION OVERRIDE/PROVIDER CONSULT:   Disposition Override: N/A   Override Source: Unspecified   Consulted with PCP: No   Consulted with On-Call Physician: No         CALLER CONTACT INFO:   Name: CHANDRA WADDELL (Self)   Phone 1: [phone] (Home) - Preferred         ENCOUNTER STARTED:   12/26/17 12:27:58 PM   ENCOUNTER ASSIGNED TO/CLOSED BY:   Florecita Magdaleno @ 12/26/17 12:51:11 PM         -------------------------------------------------------      UNDERSTANDS CARE ADVICE: Yes      AGREES WITH CARE ADVICE: No      WILL FOLLOW CARE ADVICE: No      -------------------------------------------------------   Pt advised bw was good and given the information for Dr Shelbi Lu tc/cma

## 2022-09-14 NOTE — TELEPHONE ENCOUNTER
Pt states can't see endo until November she's out on maternity leave can you recommend someone from [facility] or [facility]

## 2022-09-21 DIAGNOSIS — Z79.4 TYPE 2 DIABETES MELLITUS WITH DIABETIC NEUROPATHY, WITH LONG-TERM CURRENT USE OF INSULIN (HCC): ICD-10-CM

## 2022-09-21 DIAGNOSIS — R60.0 EDEMA OF BOTH LEGS: ICD-10-CM

## 2022-09-21 DIAGNOSIS — E11.40 TYPE 2 DIABETES MELLITUS WITH DIABETIC NEUROPATHY, WITH LONG-TERM CURRENT USE OF INSULIN (HCC): ICD-10-CM

## 2022-09-21 RX ORDER — PREGABALIN 100 MG/1
CAPSULE ORAL
Qty: 90 CAPSULE | Refills: 0 | Status: SHIPPED | OUTPATIENT
Start: 2022-09-21

## 2022-09-22 RX ORDER — FUROSEMIDE 40 MG/1
40 TABLET ORAL DAILY PRN
Qty: 30 TABLET | Refills: 5 | Status: SHIPPED | OUTPATIENT
Start: 2022-09-22

## 2022-09-23 ENCOUNTER — TELEMEDICINE (OUTPATIENT)
Dept: GERIATRICS | Age: 67
End: 2022-09-23
Payer: COMMERCIAL

## 2022-09-23 DIAGNOSIS — G31.84 MCI (MILD COGNITIVE IMPAIRMENT): Primary | ICD-10-CM

## 2022-09-23 DIAGNOSIS — Z79.4 TYPE 2 DIABETES MELLITUS WITH DIABETIC NEUROPATHY, WITH LONG-TERM CURRENT USE OF INSULIN (HCC): ICD-10-CM

## 2022-09-23 DIAGNOSIS — R26.81 GAIT INSTABILITY: ICD-10-CM

## 2022-09-23 DIAGNOSIS — F11.11 HISTORY OF OPIOID ABUSE (HCC): ICD-10-CM

## 2022-09-23 DIAGNOSIS — E11.40 TYPE 2 DIABETES MELLITUS WITH DIABETIC NEUROPATHY, WITH LONG-TERM CURRENT USE OF INSULIN (HCC): ICD-10-CM

## 2022-09-23 DIAGNOSIS — I10 ESSENTIAL HYPERTENSION: ICD-10-CM

## 2022-09-23 DIAGNOSIS — E78.5 DYSLIPIDEMIA: ICD-10-CM

## 2022-09-23 PROCEDURE — 99483 ASSMT & CARE PLN PT COG IMP: CPT | Performed by: STUDENT IN AN ORGANIZED HEALTH CARE EDUCATION/TRAINING PROGRAM
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
## 2022-09-23 NOTE — PROGRESS NOTES
Virtual Regular Visit    Verification of patient location:    Patient is located in the following state in which I hold an active license {[facility] virtual patient location:[address]}      Assessment/Plan:    Problem List Items Addressed This Visit        Endocrine    Type 2 diabetes mellitus with diabetic neuropathy, with long-term current use of insulin ([facility] )       Cardiovascular and Mediastinum    Essential hypertension       Other    Dyslipidemia    MCI (mild cognitive impairment) - Primary    Relevant Orders    Ambulatory Referral to Speech Therapy    History of opioid abuse ([facility] )    Gait instability               Reason for visit is   Chief Complaint   Patient presents with    Virtual Regular Visit        Encounter provider Jeison Gotti MD    [facility] [address] [facility] [address] [address]  [phone]      Recent Visits  No visits were found meeting these conditions  Showing recent visits within past 7 days and meeting all other requirements  Today's Visits  Date Type Provider Dept   09/23/22 Levi Cogan, MD [address] today's visits and meeting all other requirements  Future Appointments  No visits were found meeting these conditions  Showing future appointments within next 150 days and meeting all other requirements       The patient was identified by name and date of birth  Bishop Higgins was informed that this is a telemedicine visit and that the visit is being conducted through {AMB VIRTUAL VISIT HISWEL:62027}  {Telemedicine confidentiality :51628} {Telemedicine participants:90374}  He acknowledged consent and understanding of privacy and security of the video platform  The patient has agreed to participate and understands they can discontinue the visit at any time  Patient is aware this is a billable service  Subjective  Bishop Comment is a 79 y o  male ***         HPI Past Medical History:   Diagnosis Date    Diabetes mellitus ([facility] )     Hypertension     Obesity        Past Surgical History:   Procedure Laterality Date    CORONARY ANGIOPLASTY WITH STENT PLACEMENT      ROTATOR CUFF REPAIR Right        Current Outpatient Medications   Medication Sig Dispense Refill    Accu-Chek Guide test strip       aspirin (ECOTRIN LOW STRENGTH) 81 mg EC tablet Take 81 mg by mouth daily      atorvastatin (LIPITOR) 80 mg tablet Take 1 tablet (80 mg total) by mouth daily 180 tablet 3    carvedilol (COREG) 6 25 mg tablet Take 1 tablet (6 25 mg total) by mouth 2 (two) times a day with meals 60 tablet 5    dapagliflozin (Farxiga) 10 MG tablet Take 1 tablet (10 mg total) by mouth daily 30 tablet 1    ezetimibe (ZETIA) 10 mg tablet Take 1 tablet (10 mg total) by mouth daily 30 tablet 5    furosemide (LASIX) 40 mg tablet Take 1 tablet (40 mg total) by mouth daily as needed (Leg swelling) 30 tablet 5    glipiZIDE (GLUCOTROL) 5 mg tablet Take 1 tablet (5 mg total) by mouth 2 (two) times a day before meals 180 tablet 3    insulin aspart protamine-insulin aspart (NovoLOG 70/30) 100 units/mL injection Inject 50 Units under the skin 2 (two) times a day before meals      lisinopril (ZESTRIL) 5 mg tablet Take 1 tablet (5 mg total) by mouth daily 30 tablet 5    metFORMIN (GLUCOPHAGE) 500 mg tablet Take 1 tablet (500 mg total) by mouth 2 (two) times a day with meals 180 tablet 0    pregabalin (LYRICA) 100 mg capsule TAKE ONE CAPSULE BY MOUTH THREE TIMES A DAY 90 capsule 0    spironolactone (ALDACTONE) 25 mg tablet Take 1 tablet (25 mg total) by mouth daily 30 tablet 5     No current facility-administered medications for this visit  No Known Allergies    Review of Systems    Video Exam    There were no vitals filed for this visit      Physical Exam     {Time Spent:07654}

## 2022-09-23 NOTE — PROGRESS NOTES
[facility]  [address] [facility], [address] [place] [address] [address] 1 [facility], [facility]  [phone]    Care Conference  This note was not shared with the patient due to privacy exception: note includes other individuals     NAME: Vianey Petersen  AGE: 79 y o  SEX: male  YOB: 1955  DATE OF ASSESSMENT: 08/17/22  DATE OF CONFERENCE: 09/23/22    Family Present: Girlfriend Omega Challenger  Staff Present: Dr Travis Nina    Patient / Family Goals of Care: Review cognitive decline and associated symptoms, discuss treatment options and care planning  Medical Concerns (Current/Historical): Dyslipidemia, coronary artery disease, essential hypertension, type 2 diabetes mellitus with diabetic neuropathy, with long-term current use of insulin, current mild episode of major depressive disorder, history of opioid abuse    Geriatric Syndromes/Age Related Syndromes: Mild Cognitive Impairment, gait instability    Neuropsychological   Mild Cognitive Impairment   Patient with deficits in visual spatial, executive function, language, delayed recall domains   Given history, physical exam and neurocognitive screening, this places the patient at a level of mild cognitive impairment   o Oboker Cognitive Assessment: 26/30  o Geriatric Depression Screen: 5/15  o NeuroTrax:  - 101 4 (global cognitive score)  - 111 3 (memory)   - 98 2 (executive function)  - 96  9(attention)   - 83 9 (information processing speed)  - 107 4 (visual spatial)  - 108 3 (verbal function)  - 103 8 (motor skills)    · Decision-making capacity:  The patient can make medical or financial decisions in the presence of his POA as assessed during this visit  · Staging:  Mild cognitive impairment,  significantdeficits in information processing, FAST staging not appropriate  · Will refer for cognitive therapy with a focus on information processing deficits   Remain active physically, mentally and socially  [employer] and non-pharmaceutical interventions discussed  Would defer any cholinesterase inhibitors at this time   Start vitamin B12, take 250 mcg daily   Recommend the Mediterranean diet, shown to decrease risk of memory loss and CVA   Engage in cognitively challenging exercises such as crosswords and puzzles   Consider use of apps such as [employer]  com, [employer], [facility] for cognitively stimulating online games   Maintain chronic conditions under control   Patient denies any acute symptoms of depression, GDS 2/15 today    Encourage  enrolling into a senior center/ [employer] for positive socialization   Consider blister packaging for ease of medication administration   Consider a falls Alert device as a safety precaution   No concerns with driving at this time  Should any occur in the future would recommend a fit to drive evaluation   Repeat cognitive assessment in 6 months and repeat MRI in 1 year    Social / Safety Concerns   Consider a [address] or [employer] for positive socialization, physical exercise, cognitive stimulation and family respite   Stay in touch with family and friends   Plan self-care activities for your mental well-being each week   Consider volunteering through [employer] ([phone]) or [employer]   Recommend review of fall risk prevention tips   Recommend use of fall precautions including fall alert device   Consider assistance for medication administration such as blister packaging or use of an automated pill dispenser   Consider contacting your local [address] on Aging for possible eligible programs such as OPTIONS, Caregiver Support Program, or [address] [place] [address] updated advance directives and provide a copy to your primary care provider   Consider caregiver support groups and educational resources through the Alzheimer's Association; access Alzheimer's Association 24/7 Helpline at [phone]  ?  [facility] does offer a monthly caregiver support group  If interested, please speak with a    Utilize reorientation and redirection as needed (dependent on situation)  [employer] information provided    Diagnostic Studies   Review of bloodwork: TSH with reflex T4, B12, Folate, RPR, urine tox showing positive cannabinoid   Review of MRI NeuroQuant (09/02/22): White matter changes suggestive of chronic microangiopathy  No acute intracranial pathology  NeuroQuant analysis was performed: Although slightly decreased in volume, hippocampal volume measurements are within the low end of normal range  The inferior lateral ventricles are enlarged more than 2 standard deviations above the norm and the patient's hippocampal occupancy score is more than 2 standard deviations below the norm  Findings may be reflective of a mesial temporal lobe focused neurodegenerative process and 6-12 month follow-up examination is recommended to ascertain change over time      Physical Finding Impacting Function   Timed Up and Go Test: 14 seconds (Fall risk assessment: low)   Activities of Daily Living: Independent   Instrumental Activities of Daily Living: Independent     Encourage appropriate footwear at all times   Review fall risk prevention tips and adjust within the home environment as needed    Medications Reviewed    Medications seem appropriate for present conditions   Check with PCP before using over the counter medications   Avoid over the counter medications that can affect cognition (e g , Benadryl, Tylenol PM)    Avoid NSAIDs due to risk of GI bleed and renal impairment    Other Findings   Overall health   BMI: 34 24 kg/m2   Maintain well-balanced diet   Continue following with primary care physician regularly   Dyslipidemia  · Currently on high dose of statin and ezetimibe  Coronary artery disease involving native heart without angina pectoris, unspecified vessel or lesion type  · Patient is on aspirin, high-dose atorvastatin and ezetimibe  · Exercise and heart healthy diet highly recommended  Essential hypertension  ·  Stable  · Currently on lisinopril 5 mg daily, spironolactone and furosemide along with carvedilol 6 25 mg b i d  Type 2 diabetes mellitus with diabetic neuropathy, with long-term current use of insulin  · HGBA1C 9 1 on 06/13/22, Uncontrolled type 2 diabetes mellitus  · Patient is using mixed insulin 50 units b i d , he is also on metformin 500 mg 2 times a day   · Diet, and medication compliance can be a factor, also weight loss is highly recommended  · Current mild episode of major depressive disorder, unspecified whether recurrent   · No HI/SI  Patient denies any active symptoms  GDS 2/15 today   History of opioid abuse   · Patient has intractable chronic back pain and neuropathic pain from his diabetes  · Patient is currently taking pregabalin, will consider adding SNRI ( duloxetine), also refer her for pain management and sports medicine  · UDS  Positive for cannabinoid which patient denied using  Recommend following up with PCP for retesting in the future  Gait Instability   History of falls   Consider referral to physical therapy for gait training, balance and strengthening should gait instability worsen    Recommend a falls alert device as a safety precaution    Recommended Health Maintenance   Immunizations, if not contraindicated:    Influenza vaccine yearly   Pneumo vaccine every 5 years (65 years and over)   Shingles vaccine   COVID-19 vaccine    Patient and family verbalized understanding of above care plan  For care coordination purposes, this care plan will be shared with your primary care provider  With any questions, please contact our office at [phone]

## 2022-09-27 ENCOUNTER — TELEPHONE (OUTPATIENT)
Dept: GERIATRICS | Age: 67
End: 2022-09-27

## 2022-09-27 ENCOUNTER — TELEPHONE (OUTPATIENT)
Dept: FAMILY MEDICINE CLINIC | Facility: CLINIC | Age: 67
End: 2022-09-27

## 2022-09-27 ENCOUNTER — OFFICE VISIT (OUTPATIENT)
Dept: FAMILY MEDICINE CLINIC | Facility: CLINIC | Age: 67
End: 2022-09-27
Payer: COMMERCIAL

## 2022-09-27 VITALS
WEIGHT: 252.4 LBS | HEART RATE: 80 BPM | SYSTOLIC BLOOD PRESSURE: 130 MMHG | DIASTOLIC BLOOD PRESSURE: 80 MMHG | RESPIRATION RATE: 18 BRPM | TEMPERATURE: 98.3 F | BODY MASS INDEX: 35.34 KG/M2 | HEIGHT: 71 IN

## 2022-09-27 DIAGNOSIS — Z79.4 TYPE 2 DIABETES MELLITUS WITH DIABETIC NEUROPATHY, WITH LONG-TERM CURRENT USE OF INSULIN (HCC): Primary | ICD-10-CM

## 2022-09-27 DIAGNOSIS — L98.8 MACERATION OF SKIN: ICD-10-CM

## 2022-09-27 DIAGNOSIS — E11.40 TYPE 2 DIABETES MELLITUS WITH DIABETIC NEUROPATHY, WITH LONG-TERM CURRENT USE OF INSULIN (HCC): Primary | ICD-10-CM

## 2022-09-27 PROBLEM — F11.11 HISTORY OF OPIOID ABUSE (HCC): Status: RESOLVED | Noted: 2022-08-17 | Resolved: 2022-09-27

## 2022-09-27 PROCEDURE — 1160F RVW MEDS BY RX/DR IN RCRD: CPT | Performed by: FAMILY MEDICINE

## 2022-09-27 PROCEDURE — 3079F DIAST BP 80-89 MM HG: CPT | Performed by: FAMILY MEDICINE

## 2022-09-27 PROCEDURE — 99213 OFFICE O/P EST LOW 20 MIN: CPT | Performed by: FAMILY MEDICINE

## 2022-09-27 PROCEDURE — 3075F SYST BP GE 130 - 139MM HG: CPT | Performed by: FAMILY MEDICINE

## 2022-09-27 RX ORDER — PHENTERMINE HYDROCHLORIDE 37.5 MG/1
TABLET ORAL
COMMUNITY
Start: 2022-09-23

## 2022-09-27 NOTE — PROGRESS NOTES
Chief Complaint   Patient presents with    Elbow Injury    feet problem        Patient ID: Mame Farmer is a 79 y o  male  HPI  Pt is seeing for L elbow " rug burn" sustained 1 wk ago - started on Glipizide -  FBG in 80s and pt is feeling 'shaky' - started on Lyrica as well 2 wks ago for DM neuropathy  -  Did not start [place]     The following portions of the patient's history were reviewed and updated as appropriate: allergies, current medications, past family history, past medical history, past social history, past surgical history and problem list     Review of Systems   Constitutional: Positive for fatigue  Negative for activity change, appetite change and fever  HENT: Negative  Respiratory: Negative  Cardiovascular: Negative  Gastrointestinal: Negative  Genitourinary: Negative  Musculoskeletal: Negative  Skin: Positive for wound (l elbow )  Neurological: Positive for numbness (and tingling in both feet )  Psychiatric/Behavioral: Negative          Current Outpatient Medications   Medication Sig Dispense Refill    Accu-Chek Guide test strip       aspirin (ECOTRIN LOW STRENGTH) 81 mg EC tablet Take 81 mg by mouth daily      atorvastatin (LIPITOR) 80 mg tablet Take 1 tablet (80 mg total) by mouth daily 180 tablet 3    carvedilol (COREG) 6 25 mg tablet Take 1 tablet (6 25 mg total) by mouth 2 (two) times a day with meals 60 tablet 5    ezetimibe (ZETIA) 10 mg tablet Take 1 tablet (10 mg total) by mouth daily 30 tablet 5    furosemide (LASIX) 40 mg tablet Take 1 tablet (40 mg total) by mouth daily as needed (Leg swelling) 30 tablet 5    glipiZIDE (GLUCOTROL) 5 mg tablet Take 1 tablet (5 mg total) by mouth 2 (two) times a day before meals 180 tablet 3    insulin aspart protamine-insulin aspart (NovoLOG 70/30) 100 units/mL injection Inject 50 Units under the skin 2 (two) times a day before meals      lisinopril (ZESTRIL) 5 mg tablet Take 1 tablet (5 mg total) by mouth daily 30 tablet 5    metFORMIN (GLUCOPHAGE) 500 mg tablet Take 1 tablet (500 mg total) by mouth 2 (two) times a day with meals 180 tablet 0    phentermine (ADIPEX-P) 37 5 MG tablet       pregabalin (LYRICA) 100 mg capsule TAKE ONE CAPSULE BY MOUTH THREE TIMES A DAY 90 capsule 0    spironolactone (ALDACTONE) 25 mg tablet Take 1 tablet (25 mg total) by mouth daily 30 tablet 5    dapagliflozin (Farxiga) 10 MG tablet Take 1 tablet (10 mg total) by mouth daily (Patient not taking: Reported on 9/27/2022) 30 tablet 1     No current facility-administered medications for this visit  Objective:    /80 (BP Location: Left arm, Patient Position: Sitting, Cuff Size: Large)   Pulse 80   Temp 98 3 °F (36 8 °C)   Resp 18   Ht 5' 11 25" (1 81 m)   Wt 114 kg (252 lb 6 4 oz)   BMI 34 96 kg/m²        Physical Exam  Constitutional:       Appearance: He is obese  He is not ill-appearing  Cardiovascular:      Rate and Rhythm: Normal rate  Pulmonary:      Effort: Pulmonary effort is normal  No respiratory distress  Musculoskeletal:      Right lower leg: No edema  Left lower leg: No edema  Skin:     Findings: Wound (maceration L elbow 3 x 1 cm ) present  Neurological:      Mental Status: He is alert  Labs in chart were reviewed        Assessment/Plan:         Diagnoses and all orders for this visit:    Type 2 diabetes mellitus with diabetic neuropathy, with long-term current use of insulin (HCC)  -     Empagliflozin (Jardiance) 25 MG TABS; Take 1 tablet (25 mg total) by mouth in the morning    Maceration of skin  -     silver sulfadiazine (SILVADENE,SSD) 1 % cream; Apply topically daily    Other orders  -     phentermine (ADIPEX-P) 37 5 MG tablet        rto in 3 m                   Luis F Dee MD

## 2022-09-27 NOTE — TELEPHONE ENCOUNTER
Patient, Sincere Martínez ([phone]) called to ask if provider wanted additional lab studies done  Last labs were completed on 9/14/22  Caller aware provider will not get this message until 10/3/22

## 2022-10-16 DIAGNOSIS — I10 ESSENTIAL HYPERTENSION: ICD-10-CM

## 2022-10-17 RX ORDER — SPIRONOLACTONE 25 MG/1
25 TABLET ORAL DAILY
Qty: 30 TABLET | Refills: 0 | Status: SHIPPED | OUTPATIENT
Start: 2022-10-17

## 2022-10-18 ENCOUNTER — TELEPHONE (OUTPATIENT)
Dept: GERIATRICS | Age: 67
End: 2022-10-18

## 2022-10-18 NOTE — TELEPHONE ENCOUNTER
Patient called regarding receiving a voice mail to contact our office  I offered him a follow up appointment  Patient declined  Too far of a travel

## 2022-11-12 DIAGNOSIS — I10 ESSENTIAL HYPERTENSION: ICD-10-CM

## 2022-11-12 DIAGNOSIS — R60.0 EDEMA OF BOTH LEGS: ICD-10-CM

## 2022-11-14 RX ORDER — FUROSEMIDE 40 MG/1
40 TABLET ORAL DAILY PRN
Qty: 30 TABLET | Refills: 0 | Status: SHIPPED | OUTPATIENT
Start: 2022-11-14

## 2022-11-14 RX ORDER — SPIRONOLACTONE 25 MG/1
25 TABLET ORAL DAILY
Qty: 30 TABLET | Refills: 0 | Status: SHIPPED | OUTPATIENT
Start: 2022-11-14

## 2022-11-17 ENCOUNTER — OFFICE VISIT (OUTPATIENT)
Dept: FAMILY MEDICINE CLINIC | Facility: CLINIC | Age: 67
End: 2022-11-17

## 2022-11-17 ENCOUNTER — APPOINTMENT (OUTPATIENT)
Dept: RADIOLOGY | Facility: CLINIC | Age: 67
End: 2022-11-17

## 2022-11-17 VITALS
SYSTOLIC BLOOD PRESSURE: 124 MMHG | WEIGHT: 248 LBS | DIASTOLIC BLOOD PRESSURE: 78 MMHG | BODY MASS INDEX: 34.72 KG/M2 | TEMPERATURE: 96.8 F | HEART RATE: 71 BPM | HEIGHT: 71 IN | RESPIRATION RATE: 18 BRPM

## 2022-11-17 DIAGNOSIS — M25.512 ACUTE PAIN OF LEFT SHOULDER: ICD-10-CM

## 2022-11-17 DIAGNOSIS — M25.512 ACUTE PAIN OF LEFT SHOULDER: Primary | ICD-10-CM

## 2022-11-17 RX ORDER — MELOXICAM 15 MG/1
15 TABLET ORAL DAILY
Qty: 30 TABLET | Refills: 0 | Status: SHIPPED | OUTPATIENT
Start: 2022-11-17

## 2022-11-17 NOTE — PROGRESS NOTES
Chief Complaint   Patient presents with   • Arm Pain     Left arm/ shoulder pain about 2 weeks        Patient ID: Frank Friend is a 79 y o  male  Shoulder Pain   The pain is present in the left shoulder and left arm  This is a new problem  The current episode started 1 to 4 weeks ago  There has been no history of extremity trauma  The problem occurs intermittently  The problem has been unchanged  The quality of the pain is described as dull and aching  The pain is at a severity of 5/10  The pain is moderate  Associated symptoms include a limited range of motion  Pertinent negatives include no fever, inability to bear weight, itching, joint locking, joint swelling, numbness, stiffness or tingling  The symptoms are aggravated by activity  He has tried nothing for the symptoms  The treatment provided no relief  Family history does not include rheumatoid arthritis  There is no history of gout, osteoarthritis or rheumatoid arthritis  was painting his house for several days prior to pain started       The following portions of the patient's history were reviewed and updated as appropriate: allergies, current medications, past family history, past medical history, past social history, past surgical history and problem list     Review of Systems   Constitutional: Negative for fever  Musculoskeletal: Negative for gout and stiffness  Skin: Negative for itching  Neurological: Negative for tingling and numbness         Current Outpatient Medications   Medication Sig Dispense Refill   • Accu-Chek Guide test strip      • aspirin (ECOTRIN LOW STRENGTH) 81 mg EC tablet Take 81 mg by mouth daily     • atorvastatin (LIPITOR) 80 mg tablet Take 1 tablet (80 mg total) by mouth daily 180 tablet 3   • carvedilol (COREG) 6 25 mg tablet Take 1 tablet (6 25 mg total) by mouth 2 (two) times a day with meals 60 tablet 5   • Empagliflozin (Jardiance) 25 MG TABS Take 1 tablet (25 mg total) by mouth in the morning 30 tablet 1   • ezetimibe (ZETIA) 10 mg tablet Take 1 tablet (10 mg total) by mouth daily 30 tablet 5   • furosemide (LASIX) 40 mg tablet Take 1 tablet (40 mg total) by mouth daily as needed (Leg swelling) 30 tablet 0   • glipiZIDE (GLUCOTROL) 5 mg tablet Take 1 tablet (5 mg total) by mouth 2 (two) times a day before meals 180 tablet 3   • insulin aspart protamine-insulin aspart (NovoLOG 70/30) 100 units/mL injection Inject 50 Units under the skin 2 (two) times a day before meals     • lisinopril (ZESTRIL) 5 mg tablet Take 1 tablet (5 mg total) by mouth daily 30 tablet 5   •       • metFORMIN (GLUCOPHAGE) 500 mg tablet Take 1 tablet (500 mg total) by mouth 2 (two) times a day with meals 180 tablet 0   • phentermine (ADIPEX-P) 37 5 MG tablet      • pregabalin (LYRICA) 100 mg capsule TAKE ONE CAPSULE BY MOUTH THREE TIMES A DAY 90 capsule 0   • silver sulfadiazine (SILVADENE,SSD) 1 % cream Apply topically daily 50 g 0   • spironolactone (ALDACTONE) 25 mg tablet Take 1 tablet (25 mg total) by mouth daily 30 tablet 0     No current facility-administered medications for this visit  Objective:    /78 (BP Location: Left arm, Patient Position: Sitting, Cuff Size: Large)   Pulse 71   Temp (!) 96 8 °F (36 °C) (Temporal)   Resp 18   Ht 5' 11" (1 803 m)   Wt 112 kg (248 lb)   BMI 34 59 kg/m²        Physical Exam  Constitutional:       Appearance: He is not ill-appearing  Musculoskeletal:      Left shoulder: Tenderness present  No swelling, deformity or bony tenderness  Decreased range of motion  Normal strength  Normal pulse  Neurological:      Mental Status: He is alert  Assessment/Plan:         Diagnoses and all orders for this visit:    Acute pain of left shoulder  -     Ambulatory referral to Orthopedic Surgery; Future  -     Ambulatory Referral to Physical Therapy; Future  -     meloxicam (Mobic) 15 mg tablet; Take 1 tablet (15 mg total) by mouth daily  -     XR shoulder 2+ vw left;  Future    rto prn Billie Whalen MD

## 2022-11-18 ENCOUNTER — SOCIAL WORK (OUTPATIENT)
Dept: BEHAVIORAL/MENTAL HEALTH CLINIC | Facility: CLINIC | Age: 67
End: 2022-11-18

## 2022-11-18 DIAGNOSIS — F43.20 ADJUSTMENT DISORDER, UNSPECIFIED TYPE: Primary | ICD-10-CM
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
## 2022-12-04 DIAGNOSIS — Z98.61 CAD S/P PERCUTANEOUS CORONARY ANGIOPLASTY: ICD-10-CM

## 2022-12-04 DIAGNOSIS — E78.5 DYSLIPIDEMIA: ICD-10-CM

## 2022-12-04 DIAGNOSIS — E11.40 TYPE 2 DIABETES MELLITUS WITH DIABETIC NEUROPATHY, WITH LONG-TERM CURRENT USE OF INSULIN (HCC): ICD-10-CM

## 2022-12-04 DIAGNOSIS — I25.2 HISTORY OF ANTERIOR WALL MYOCARDIAL INFARCTION: ICD-10-CM

## 2022-12-04 DIAGNOSIS — I25.10 CAD S/P PERCUTANEOUS CORONARY ANGIOPLASTY: ICD-10-CM

## 2022-12-04 DIAGNOSIS — Z79.4 TYPE 2 DIABETES MELLITUS WITH DIABETIC NEUROPATHY, WITH LONG-TERM CURRENT USE OF INSULIN (HCC): ICD-10-CM

## 2022-12-04 DIAGNOSIS — M25.512 ACUTE PAIN OF LEFT SHOULDER: ICD-10-CM

## 2022-12-04 DIAGNOSIS — I10 ESSENTIAL HYPERTENSION: ICD-10-CM

## 2022-12-05 DIAGNOSIS — M25.512 ACUTE PAIN OF LEFT SHOULDER: ICD-10-CM

## 2022-12-05 DIAGNOSIS — E11.40 TYPE 2 DIABETES MELLITUS WITH DIABETIC NEUROPATHY, WITH LONG-TERM CURRENT USE OF INSULIN (HCC): ICD-10-CM

## 2022-12-05 DIAGNOSIS — Z79.4 TYPE 2 DIABETES MELLITUS WITH DIABETIC NEUROPATHY, WITH LONG-TERM CURRENT USE OF INSULIN (HCC): ICD-10-CM

## 2022-12-05 RX ORDER — MELOXICAM 15 MG/1
15 TABLET ORAL DAILY
Qty: 30 TABLET | Refills: 0 | Status: SHIPPED | OUTPATIENT
Start: 2022-12-05 | End: 2022-12-14 | Stop reason: SDUPTHER

## 2022-12-05 RX ORDER — SPIRONOLACTONE 25 MG/1
25 TABLET ORAL DAILY
Qty: 30 TABLET | Refills: 0 | Status: SHIPPED | OUTPATIENT
Start: 2022-12-05

## 2022-12-05 RX ORDER — CARVEDILOL 6.25 MG/1
6.25 TABLET ORAL 2 TIMES DAILY WITH MEALS
Qty: 60 TABLET | Refills: 0 | Status: SHIPPED | OUTPATIENT
Start: 2022-12-05

## 2022-12-05 RX ORDER — EZETIMIBE 10 MG/1
10 TABLET ORAL DAILY
Qty: 30 TABLET | Refills: 0 | Status: SHIPPED | OUTPATIENT
Start: 2022-12-05

## 2022-12-05 RX ORDER — ATORVASTATIN CALCIUM 80 MG/1
80 TABLET, FILM COATED ORAL DAILY
Qty: 180 TABLET | Refills: 0 | Status: SHIPPED | OUTPATIENT
Start: 2022-12-05

## 2022-12-05 RX ORDER — MELOXICAM 15 MG/1
15 TABLET ORAL DAILY
Qty: 30 TABLET | Refills: 0 | Status: SHIPPED | OUTPATIENT
Start: 2022-12-05 | End: 2022-12-06 | Stop reason: SDUPTHER

## 2022-12-05 RX ORDER — GLIPIZIDE 5 MG/1
5 TABLET ORAL
Qty: 180 TABLET | Refills: 0 | Status: SHIPPED | OUTPATIENT
Start: 2022-12-05

## 2022-12-05 RX ORDER — LISINOPRIL 5 MG/1
5 TABLET ORAL DAILY
Qty: 30 TABLET | Refills: 0 | Status: SHIPPED | OUTPATIENT
Start: 2022-12-05

## 2022-12-06 ENCOUNTER — SOCIAL WORK (OUTPATIENT)
Dept: BEHAVIORAL/MENTAL HEALTH CLINIC | Facility: CLINIC | Age: 67
End: 2022-12-06

## 2022-12-06 DIAGNOSIS — E11.40 TYPE 2 DIABETES MELLITUS WITH DIABETIC NEUROPATHY, WITH LONG-TERM CURRENT USE OF INSULIN (HCC): ICD-10-CM

## 2022-12-06 DIAGNOSIS — F43.20 ADJUSTMENT DISORDER, UNSPECIFIED TYPE: Primary | ICD-10-CM

## 2022-12-06 DIAGNOSIS — M25.512 ACUTE PAIN OF LEFT SHOULDER: ICD-10-CM

## 2022-12-06 DIAGNOSIS — Z79.4 TYPE 2 DIABETES MELLITUS WITH DIABETIC NEUROPATHY, WITH LONG-TERM CURRENT USE OF INSULIN (HCC): ICD-10-CM

## 2022-12-06 RX ORDER — MELOXICAM 15 MG/1
15 TABLET ORAL DAILY
Qty: 30 TABLET | Refills: 0 | Status: SHIPPED | OUTPATIENT
Start: 2022-12-06

## 2022-12-06 NOTE — PSYCH
This note was not shared with the patient due to this is a psychotherapy note   Assessment/Plan:      Diagnoses and all orders for this visit:    Adjustment disorder, unspecified type          Subjective:     Patient ID: Enrico Sanchez is a 79 y o  male who presented for his follow-up outpatient counseling session with undersigned therapist  During today's session, Kalieminna Chen reports his roommate Cecilia/girlfriend has been diagnosed with cancer and feeling helpless  Kalieminna Gustavo states Edward Schreiber is keeping information from him and he feels frustrated that "he isn't in the loop of things "  Vignesh Chen reports fear of admitting he's deprssed because he believes admitting your depressed can lead you to end up in a psychiatric unit  Vignesh Chen continues to express feelings of guilt and remorse but struggling with changing his impulsive behavior  The undersigned therapist assisted Vignesh Gustavo process his feelings about his relationsihp with Cecilia and what he's struggling with  Vignesh Gustavo reports he tries to console Edward Schreiber during this scary time but she continues to state she feels alone  Kalieminna Gustavo shared history he had with women and reports he was once  for 15 years then got  (27 years )  When he cheated on his wife  Kalieminna Chen continues to express desire to change  Couples counseling was recommended  Vignesh Chen denies suicidal intent, gesture or ideation at this time  Assessment: Vignesh Chen continues to struggle in his relationship with Cecilia as he fears "getting introuble" if he's honest   Vignesh Chen continues to withhold information from Edward Schreiber and feels she's vulnerable due to her illness  Kalieminna Chen struggles with admitting he's sad, fearful and scared  Kalieminna Gustavo  denies suicidal intent, gesture or ideation at this time  Physical Exam  Psychiatric:         Attention and Perception: Attention and perception normal          Mood and Affect: Mood is anxious and depressed  Affect is tearful           Speech: Speech normal          Behavior: Behavior normal  Behavior is cooperative  Thought Content: Thought content normal          Cognition and Memory: Cognition and memory normal          Judgment: Judgment is impulsive

## 2022-12-13 ENCOUNTER — TELEPHONE (OUTPATIENT)
Dept: FAMILY MEDICINE CLINIC | Facility: CLINIC | Age: 67
End: 2022-12-13

## 2022-12-13 NOTE — TELEPHONE ENCOUNTER
LMOM for patient to c/b in referenced to request for diabetic supplies from [facility]

## 2022-12-13 NOTE — TELEPHONE ENCOUNTER
Spoke with patient  He did not request DM supplies from [facility]    Please disregard request

## 2022-12-14 DIAGNOSIS — Z79.4 TYPE 2 DIABETES MELLITUS WITH DIABETIC NEUROPATHY, WITH LONG-TERM CURRENT USE OF INSULIN (HCC): ICD-10-CM

## 2022-12-14 DIAGNOSIS — M25.512 ACUTE PAIN OF LEFT SHOULDER: ICD-10-CM

## 2022-12-14 DIAGNOSIS — E11.40 TYPE 2 DIABETES MELLITUS WITH DIABETIC NEUROPATHY, WITH LONG-TERM CURRENT USE OF INSULIN (HCC): ICD-10-CM

## 2022-12-14 RX ORDER — MELOXICAM 15 MG/1
15 TABLET ORAL DAILY
Qty: 30 TABLET | Refills: 0 | Status: SHIPPED | OUTPATIENT
Start: 2022-12-14

## 2022-12-27 ENCOUNTER — SOCIAL WORK (OUTPATIENT)
Dept: BEHAVIORAL/MENTAL HEALTH CLINIC | Facility: CLINIC | Age: 67
End: 2022-12-27

## 2022-12-27 DIAGNOSIS — F43.20 ADJUSTMENT DISORDER, UNSPECIFIED TYPE: Primary | ICD-10-CM

## 2022-12-28 DIAGNOSIS — Z79.4 TYPE 2 DIABETES MELLITUS WITH DIABETIC NEUROPATHY, WITH LONG-TERM CURRENT USE OF INSULIN (HCC): ICD-10-CM

## 2022-12-28 DIAGNOSIS — E78.5 DYSLIPIDEMIA: ICD-10-CM

## 2022-12-28 DIAGNOSIS — I25.10 CAD S/P PERCUTANEOUS CORONARY ANGIOPLASTY: ICD-10-CM

## 2022-12-28 DIAGNOSIS — M25.512 ACUTE PAIN OF LEFT SHOULDER: ICD-10-CM

## 2022-12-28 DIAGNOSIS — Z98.61 CAD S/P PERCUTANEOUS CORONARY ANGIOPLASTY: ICD-10-CM

## 2022-12-28 DIAGNOSIS — E11.40 TYPE 2 DIABETES MELLITUS WITH DIABETIC NEUROPATHY, WITH LONG-TERM CURRENT USE OF INSULIN (HCC): ICD-10-CM

## 2022-12-29 RX ORDER — EZETIMIBE 10 MG/1
10 TABLET ORAL DAILY
Qty: 30 TABLET | Refills: 0 | Status: SHIPPED | OUTPATIENT
Start: 2022-12-29

## 2022-12-29 RX ORDER — MELOXICAM 15 MG/1
15 TABLET ORAL DAILY
Qty: 30 TABLET | Refills: 0 | Status: SHIPPED | OUTPATIENT
Start: 2022-12-29

## 2022-12-29 RX ORDER — PREGABALIN 100 MG/1
100 CAPSULE ORAL 3 TIMES DAILY
Qty: 90 CAPSULE | Refills: 0 | Status: SHIPPED | OUTPATIENT
Start: 2022-12-29

## 2023-01-05 NOTE — PSYCH
This note was not shared with the patient due to this is a psychotherapy note   Assessment/Plan:      Diagnoses and all orders for this visit:    Adjustment disorder, unspecified type          Subjective:     Patient ID: Travis Best is a 79 y o  male who presented for his follow-up outpatient counseling session with undersigned therapist   During today's session, Edith Mcintyre reports his girlfriend was diagnosed with lung cancer and feels sorry for her  Edith Mcintyre states continued feelings of inadequacy in his relationship and feeling like he is unable to help her  The undersigned therapist assisted Edith Mcintyre process his feelings about his role in the relationship and find ways to help his girlfriend  Edith Mcintyre reports getting his partner gifts for [employer] but feeling unappreciated  We continue to discuss the importance of communication and not allowing his temper to take over  Edith Mcintyre denies suicidal intent, gesture or ideation at this time  Assessment:  Review of Systems   Psychiatric/Behavioral: Positive for decreased concentration and dysphoric mood  The patient is nervous/anxious  Physical Exam  Psychiatric:         Attention and Perception: Attention and perception normal          Mood and Affect: Mood is anxious and depressed  Affect is tearful  Speech: Speech normal          Behavior: Behavior normal  Behavior is cooperative  Thought Content:  Thought content normal          Cognition and Memory: Cognition and memory normal          Judgment: Judgment normal

## 2023-01-10 DIAGNOSIS — I25.2 HISTORY OF ANTERIOR WALL MYOCARDIAL INFARCTION: ICD-10-CM

## 2023-01-10 DIAGNOSIS — I25.10 CAD S/P PERCUTANEOUS CORONARY ANGIOPLASTY: ICD-10-CM

## 2023-01-10 DIAGNOSIS — Z98.61 CAD S/P PERCUTANEOUS CORONARY ANGIOPLASTY: ICD-10-CM

## 2023-01-10 DIAGNOSIS — I10 ESSENTIAL HYPERTENSION: ICD-10-CM

## 2023-01-10 DIAGNOSIS — E11.40 TYPE 2 DIABETES MELLITUS WITH DIABETIC NEUROPATHY, WITH LONG-TERM CURRENT USE OF INSULIN (HCC): ICD-10-CM

## 2023-01-10 DIAGNOSIS — Z79.4 TYPE 2 DIABETES MELLITUS WITH DIABETIC NEUROPATHY, WITH LONG-TERM CURRENT USE OF INSULIN (HCC): ICD-10-CM

## 2023-01-10 RX ORDER — SPIRONOLACTONE 25 MG/1
25 TABLET ORAL DAILY
Qty: 30 TABLET | Refills: 0 | Status: SHIPPED | OUTPATIENT
Start: 2023-01-10

## 2023-01-10 RX ORDER — LISINOPRIL 5 MG/1
5 TABLET ORAL DAILY
Qty: 30 TABLET | Refills: 0 | Status: SHIPPED | OUTPATIENT
Start: 2023-01-10

## 2023-01-10 RX ORDER — PREGABALIN 100 MG/1
100 CAPSULE ORAL 3 TIMES DAILY
Qty: 90 CAPSULE | Refills: 0 | Status: SHIPPED | OUTPATIENT
Start: 2023-01-10

## 2023-01-11 RX ORDER — CARVEDILOL 6.25 MG/1
6.25 TABLET ORAL 2 TIMES DAILY WITH MEALS
Qty: 60 TABLET | Refills: 0 | Status: SHIPPED | OUTPATIENT
Start: 2023-01-11

## 2023-01-14 DIAGNOSIS — M25.512 ACUTE PAIN OF LEFT SHOULDER: ICD-10-CM

## 2023-01-16 RX ORDER — MELOXICAM 15 MG/1
15 TABLET ORAL DAILY
Qty: 30 TABLET | Refills: 0 | Status: SHIPPED | OUTPATIENT
Start: 2023-01-16 | End: 2023-01-19 | Stop reason: SDUPTHER

## 2023-01-19 DIAGNOSIS — R60.0 EDEMA OF BOTH LEGS: ICD-10-CM

## 2023-01-19 DIAGNOSIS — M25.512 ACUTE PAIN OF LEFT SHOULDER: ICD-10-CM

## 2023-01-19 RX ORDER — FUROSEMIDE 40 MG/1
40 TABLET ORAL DAILY PRN
Qty: 30 TABLET | Refills: 0 | Status: SHIPPED | OUTPATIENT
Start: 2023-01-19

## 2023-01-19 RX ORDER — MELOXICAM 15 MG/1
15 TABLET ORAL DAILY
Qty: 30 TABLET | Refills: 0 | Status: SHIPPED | OUTPATIENT
Start: 2023-01-19

## 2023-01-20 DIAGNOSIS — E11.40 TYPE 2 DIABETES MELLITUS WITH DIABETIC NEUROPATHY, WITH LONG-TERM CURRENT USE OF INSULIN (HCC): ICD-10-CM

## 2023-01-20 DIAGNOSIS — Z79.4 TYPE 2 DIABETES MELLITUS WITH DIABETIC NEUROPATHY, WITH LONG-TERM CURRENT USE OF INSULIN (HCC): ICD-10-CM

## 2023-01-24 ENCOUNTER — SOCIAL WORK (OUTPATIENT)
Dept: BEHAVIORAL/MENTAL HEALTH CLINIC | Facility: CLINIC | Age: 68
End: 2023-01-24

## 2023-01-24 DIAGNOSIS — F43.20 ADJUSTMENT DISORDER, UNSPECIFIED TYPE: Primary | ICD-10-CM

## 2023-01-24 NOTE — PSYCH
This note was not shared with the patient due to this is a psychotherapy note   Behavioral Health Psychotherapy Progress Note    Psychotherapy Provided: Individual Psychotherapy     1  Adjustment disorder, unspecified type            DATA:  Arik De La Garza presented for his follow-up outpatient counseling session with undersigned therapist   Renée Aparicio states his girlfriend is in the hospital with pneumonia and has lung cancer  Currently unsure of her outcome and situation  Renée Aparicio reports mom has dementia and currently lives with his sister in [place]  Renée Aparicio reports he wants to make amends with family  Renée Aparicio is concerned about Cecilia's health, and reports he's been spending as much time as he can with her  However, Renée Aparicio continues to feel that he's being treated poorly  Renée Aparicio also reports having a poor relationship with his children and grandchildren but no real effort or desire to work through it  Renée Aparicio denies suicidal intent, gesture or ideation at this time  During this session, this clinician used the following therapeutic modalities: Cognitive Behavioral Therapy and Supportive Psychotherapy    Substance Abuse was not addressed during this session  Renée Aparicio continues to deny using substances but reports history of opiate abuse  ASSESSMENT:  Victorino Pacheco presents with a Euthymic/ normal, Anxious and Dysthymic mood  his affect is Normal range and intensity, which is congruent, with his mood and the content of the session  The client has not made progress on their goals  Victorino Pacheco presents with a none risk of suicide, none risk of self-harm, and none risk of harm to others  For any risk assessment that surpasses a "low" rating, a safety plan must be developed  A safety plan was indicated: no  If yes, describe in detail NA     PLAN: Between sessions, Victorino Pacheco will continue meeting as needed   At the next session, the therapist will use Cognitive Behavioral Therapy to address his current stressors  Behavioral Health Treatment Plan and Discharge Planning: Jonathan Garcia is aware of and agrees to continue to work on their treatment plan       Visit start and stop times:    01/24/23  Start Time: 1130  Stop Time: 1230  Total Visit Time: 60 minutes

## 2023-01-31 DIAGNOSIS — E78.5 DYSLIPIDEMIA: ICD-10-CM

## 2023-01-31 DIAGNOSIS — Z98.61 CAD S/P PERCUTANEOUS CORONARY ANGIOPLASTY: ICD-10-CM

## 2023-01-31 DIAGNOSIS — I25.10 CAD S/P PERCUTANEOUS CORONARY ANGIOPLASTY: ICD-10-CM

## 2023-01-31 RX ORDER — EZETIMIBE 10 MG/1
10 TABLET ORAL DAILY
Qty: 30 TABLET | Refills: 0 | Status: SHIPPED | OUTPATIENT
Start: 2023-01-31

## 2023-02-06 ENCOUNTER — OFFICE VISIT (OUTPATIENT)
Dept: FAMILY MEDICINE CLINIC | Facility: CLINIC | Age: 68
End: 2023-02-06

## 2023-02-06 VITALS
WEIGHT: 263.2 LBS | BODY MASS INDEX: 36.85 KG/M2 | HEIGHT: 71 IN | RESPIRATION RATE: 14 BRPM | HEART RATE: 84 BPM | TEMPERATURE: 97.4 F | SYSTOLIC BLOOD PRESSURE: 138 MMHG | DIASTOLIC BLOOD PRESSURE: 76 MMHG

## 2023-02-06 DIAGNOSIS — E11.40 TYPE 2 DIABETES MELLITUS WITH DIABETIC NEUROPATHY, WITHOUT LONG-TERM CURRENT USE OF INSULIN (HCC): ICD-10-CM

## 2023-02-06 DIAGNOSIS — J06.9 UPPER RESPIRATORY TRACT INFECTION, UNSPECIFIED TYPE: Primary | ICD-10-CM

## 2023-02-06 DIAGNOSIS — F32.0 CURRENT MILD EPISODE OF MAJOR DEPRESSIVE DISORDER, UNSPECIFIED WHETHER RECURRENT (HCC): ICD-10-CM

## 2023-02-06 RX ORDER — AZITHROMYCIN 250 MG/1
TABLET, FILM COATED ORAL
Qty: 6 TABLET | Refills: 0 | Status: SHIPPED | OUTPATIENT
Start: 2023-02-06 | End: 2023-02-11

## 2023-02-06 NOTE — PROGRESS NOTES
Clinic Visit Note  Js Perez 79 y o  male   MRN: [id number]    Assessment and Plan      Diagnoses and all orders for this visit:    Upper respiratory tract infection, unspecified type  Unclear etiology, given recent exposure recommend antibiotic therapy with azithromycin which has worked well for patient in the past   If symptoms worsen or not improving recommend reevaluation  OTC symptomatic management  -     azithromycin (Zithromax) 250 mg tablet; Take 2 tablets (500 mg total) by mouth daily for 1 day, THEN 1 tablet (250 mg total) daily for 4 days  Current mild episode of major depressive disorder, unspecified whether recurrent ([address] )  Mood stable on exam today, continue to monitor  Type 2 diabetes mellitus with diabetic neuropathy, without long-term current use of insulin ([facility])  Most recent hemoglobin A1c 8 7%, recommend following up with primary care physician to discuss diabetic management  Recommend bringing in all medications to make sure we can confirm medication dosages  My impressions and treatment recommendations were discussed in detail with the patient who verbalized understanding and had no further questions  Discharge instructions were provided  Subjective     Chief Complaint: Same-day/follow-up visit    History of Present Illness:    Patient is a pleasant 77-year-old male coming in same-day visit secondary to upper respiratory tract infection symptoms, fatigue has been going on for the past week  Positive sick contacts  The following portions of the patient's history were reviewed and updated as appropriate: allergies, current medications, past family history, past medical history, past social history, past surgical history and problem list     REVIEW OF SYSTEMS:  A complete 12-point review of systems is negative other than that noted in the HPI  Review of Systems   Constitutional: Positive for fatigue  Negative for chills and fever     HENT: Negative for congestion, postnasal drip and sore throat  Respiratory: Negative for cough, shortness of breath and wheezing  Cardiovascular: Negative for chest pain, palpitations and leg swelling  Neurological: Negative for dizziness and headaches  Psychiatric/Behavioral: Negative for agitation, behavioral problems and confusion  Current Outpatient Medications:   •  Accu-Chek Guide test strip, , Disp: , Rfl:   •  aspirin (ECOTRIN LOW STRENGTH) 81 mg EC tablet, Take 81 mg by mouth daily, Disp: , Rfl:   •  atorvastatin (LIPITOR) 80 mg tablet, Take 1 tablet (80 mg total) by mouth daily, Disp: 180 tablet, Rfl: 0  •  azithromycin (Zithromax) 250 mg tablet, Take 2 tablets (500 mg total) by mouth daily for 1 day, THEN 1 tablet (250 mg total) daily for 4 days  , Disp: 6 tablet, Rfl: 0  •  carvedilol (COREG) 6 25 mg tablet, Take 1 tablet (6 25 mg total) by mouth 2 (two) times a day with meals, Disp: 60 tablet, Rfl: 0  •  ezetimibe (ZETIA) 10 mg tablet, Take 1 tablet (10 mg total) by mouth daily, Disp: 30 tablet, Rfl: 0  •  furosemide (LASIX) 40 mg tablet, Take 1 tablet (40 mg total) by mouth daily as needed (Leg swelling), Disp: 30 tablet, Rfl: 0  •  glipiZIDE (GLUCOTROL) 5 mg tablet, Take 1 tablet (5 mg total) by mouth 2 (two) times a day before meals, Disp: 180 tablet, Rfl: 0  •  insulin aspart protamine-insulin aspart (NovoLOG 70/30) 100 units/mL injection, Inject 50 Units under the skin 2 (two) times a day before meals, Disp: , Rfl:   •  lisinopril (ZESTRIL) 5 mg tablet, Take 1 tablet (5 mg total) by mouth daily, Disp: 30 tablet, Rfl: 0  •  meloxicam (Mobic) 15 mg tablet, Take 1 tablet (15 mg total) by mouth daily, Disp: 30 tablet, Rfl: 0  •  metFORMIN (GLUCOPHAGE) 500 mg tablet, Take 1 tablet (500 mg total) by mouth 2 (two) times a day with meals, Disp: 180 tablet, Rfl: 0  •  phentermine (ADIPEX-P) 37 5 MG tablet, , Disp: , Rfl:   •  pregabalin (LYRICA) 100 mg capsule, Take 1 capsule (100 mg total) by mouth 3 (three) times a day, Disp: 90 capsule, Rfl: 0  •  silver sulfadiazine (SILVADENE,SSD) 1 % cream, Apply topically daily, Disp: 50 g, Rfl: 0  •  spironolactone (ALDACTONE) 25 mg tablet, Take 1 tablet (25 mg total) by mouth daily, Disp: 30 tablet, Rfl: 0  •  Empagliflozin (Jardiance) 25 MG TABS, Take 1 tablet (25 mg total) by mouth in the morning (Patient not taking: Reported on 2/6/2023), Disp: 30 tablet, Rfl: 0  •  metFORMIN (GLUCOPHAGE) 500 mg tablet, Take 1 tablet (500 mg total) by mouth 2 (two) times a day with meals (Patient not taking: Reported on 2/6/2023), Disp: 180 tablet, Rfl: 0  Past Medical History:   Diagnosis Date   • Diabetes mellitus ([facility] [address] )    • Hypertension    • Obesity      Past Surgical History:   Procedure Laterality Date   • CORONARY ANGIOPLASTY WITH STENT PLACEMENT     • ROTATOR CUFF REPAIR Right      Social History     Socioeconomic History   • Marital status: Single     Spouse name: Not on file   • Number of children: Not on file   • Years of education: Not on file   • Highest education level: Not on file   Occupational History   • Not on file   Tobacco Use   • Smoking status: Former   • Smokeless tobacco: Never   Vaping Use   • Vaping Use: Never used   Substance and Sexual Activity   • Alcohol use: Not Currently   • Drug use: Never   • Sexual activity: Not on file   Other Topics Concern   • Not on file   Social History Narrative   • Not on file     Social Determinants of Health     Financial Resource Strain: Not on file   Food Insecurity: Not on file   Transportation Needs: Not on file   Physical Activity: Not on file   Stress: Not on file   Social Connections: Not on file   Intimate Partner Violence: Not on file   Housing Stability: Not on file     Family History   Problem Relation Age of Onset   • Dementia Mother    • Diabetes Father      No Known Allergies    Objective     Vitals:    02/06/23 1500   BP: 138/76   BP Location: Left arm   Patient Position: Sitting   Cuff Size: Large   Pulse: 84   Resp: 14 Temp: (!) 97 4 °F (36 3 °C)   TempSrc: Temporal   Weight: 119 kg (263 lb 3 2 oz)   Height: 5' 11" (1 803 m)       Physical Exam:     GENERAL: NAD, pleasant   HEENT:  NC/AT, PERRL, EOMI, no scleral icterus, pharynx erythema  CARDIAC:  RRR, +S1/S2, no S3/S4 appreciated, no m/g/r  PULMONARY:  CTA B/L, no wheezing/rales/rhonci, non-labored breathing  ABDOMEN:  Soft, NT/ND, no rebound/guarding/rigidity  Extremities:  No edema, cyanosis, or clubbing  Musculoskeletal:  Full range of motion intact in all extremities   NEUROLOGIC: Grossly intact, no focal deficits  SKIN:  No rashes or erythema noted on exposed skin  Psych: Normal affect, mood stable    ==  PLEASE NOTE:  This encounter was completed utilizing the [employer] Direct Speech Voice Recognition Software  Grammatical errors, random word insertions, pronoun errors and incomplete sentences are occasional consequences of the system due to software limitations, ambient noise and hardware issues  These may be missed by proof reading prior to affixing electronic signature  Any questions or concerns about the content, text or information contained within the body of this dictation should be directly addressed to the physician for clarification  Please do not hesitate to call me directly if you have any any questions or concerns      DO Sherice Meadows [address] Internal Medicine   [facility]

## 2023-02-09 ENCOUNTER — TELEPHONE (OUTPATIENT)
Dept: SPEECH THERAPY | Facility: CLINIC | Age: 68
End: 2023-02-09

## 2023-02-13 DIAGNOSIS — Z98.61 CAD S/P PERCUTANEOUS CORONARY ANGIOPLASTY: ICD-10-CM

## 2023-02-13 DIAGNOSIS — I10 ESSENTIAL HYPERTENSION: ICD-10-CM

## 2023-02-13 DIAGNOSIS — I25.10 CAD S/P PERCUTANEOUS CORONARY ANGIOPLASTY: ICD-10-CM

## 2023-02-13 DIAGNOSIS — I25.2 HISTORY OF ANTERIOR WALL MYOCARDIAL INFARCTION: ICD-10-CM

## 2023-02-13 RX ORDER — CARVEDILOL 6.25 MG/1
6.25 TABLET ORAL 2 TIMES DAILY WITH MEALS
Qty: 60 TABLET | Refills: 0 | Status: SHIPPED | OUTPATIENT
Start: 2023-02-13

## 2023-02-14 DIAGNOSIS — I10 ESSENTIAL HYPERTENSION: ICD-10-CM

## 2023-02-14 RX ORDER — SPIRONOLACTONE 25 MG/1
25 TABLET ORAL DAILY
Qty: 30 TABLET | Refills: 0 | Status: SHIPPED | OUTPATIENT
Start: 2023-02-14

## 2023-02-14 RX ORDER — LISINOPRIL 5 MG/1
5 TABLET ORAL DAILY
Qty: 30 TABLET | Refills: 0 | Status: SHIPPED | OUTPATIENT
Start: 2023-02-14

## 2023-02-22 DIAGNOSIS — R60.0 EDEMA OF BOTH LEGS: ICD-10-CM

## 2023-02-22 DIAGNOSIS — M25.512 ACUTE PAIN OF LEFT SHOULDER: ICD-10-CM

## 2023-02-22 RX ORDER — FUROSEMIDE 40 MG/1
40 TABLET ORAL DAILY PRN
Qty: 30 TABLET | Refills: 0 | Status: SHIPPED | OUTPATIENT
Start: 2023-02-22

## 2023-02-22 RX ORDER — MELOXICAM 15 MG/1
15 TABLET ORAL DAILY
Qty: 30 TABLET | Refills: 0 | Status: SHIPPED | OUTPATIENT
Start: 2023-02-22

## 2023-02-23 DIAGNOSIS — E11.40 TYPE 2 DIABETES MELLITUS WITH DIABETIC NEUROPATHY, WITH LONG-TERM CURRENT USE OF INSULIN (HCC): ICD-10-CM

## 2023-02-23 DIAGNOSIS — Z79.4 TYPE 2 DIABETES MELLITUS WITH DIABETIC NEUROPATHY, WITH LONG-TERM CURRENT USE OF INSULIN (HCC): ICD-10-CM

## 2023-02-23 RX ORDER — GLIPIZIDE 5 MG/1
5 TABLET ORAL
Qty: 180 TABLET | Refills: 0 | Status: SHIPPED | OUTPATIENT
Start: 2023-02-23

## 2023-03-07 ENCOUNTER — SOCIAL WORK (OUTPATIENT)
Dept: BEHAVIORAL/MENTAL HEALTH CLINIC | Facility: CLINIC | Age: 68
End: 2023-03-07

## 2023-03-07 DIAGNOSIS — F43.20 ADJUSTMENT DISORDER, UNSPECIFIED TYPE: Primary | ICD-10-CM

## 2023-03-11 DIAGNOSIS — I25.10 CAD S/P PERCUTANEOUS CORONARY ANGIOPLASTY: ICD-10-CM

## 2023-03-11 DIAGNOSIS — I10 ESSENTIAL HYPERTENSION: ICD-10-CM

## 2023-03-11 DIAGNOSIS — Z98.61 CAD S/P PERCUTANEOUS CORONARY ANGIOPLASTY: ICD-10-CM

## 2023-03-11 DIAGNOSIS — E78.5 DYSLIPIDEMIA: ICD-10-CM

## 2023-03-13 DIAGNOSIS — Z98.61 CAD S/P PERCUTANEOUS CORONARY ANGIOPLASTY: ICD-10-CM

## 2023-03-13 DIAGNOSIS — I25.10 CAD S/P PERCUTANEOUS CORONARY ANGIOPLASTY: ICD-10-CM

## 2023-03-13 DIAGNOSIS — I10 ESSENTIAL HYPERTENSION: ICD-10-CM

## 2023-03-13 DIAGNOSIS — E11.40 TYPE 2 DIABETES MELLITUS WITH DIABETIC NEUROPATHY, WITH LONG-TERM CURRENT USE OF INSULIN (HCC): ICD-10-CM

## 2023-03-13 DIAGNOSIS — Z79.4 TYPE 2 DIABETES MELLITUS WITH DIABETIC NEUROPATHY, WITH LONG-TERM CURRENT USE OF INSULIN (HCC): ICD-10-CM

## 2023-03-13 DIAGNOSIS — I25.2 HISTORY OF ANTERIOR WALL MYOCARDIAL INFARCTION: ICD-10-CM

## 2023-03-13 RX ORDER — CARVEDILOL 6.25 MG/1
6.25 TABLET ORAL 2 TIMES DAILY WITH MEALS
Qty: 60 TABLET | Refills: 0 | Status: SHIPPED | OUTPATIENT
Start: 2023-03-13

## 2023-03-13 RX ORDER — LISINOPRIL 5 MG/1
5 TABLET ORAL DAILY
Qty: 30 TABLET | Refills: 0 | Status: SHIPPED | OUTPATIENT
Start: 2023-03-13

## 2023-03-13 RX ORDER — PREGABALIN 100 MG/1
100 CAPSULE ORAL 3 TIMES DAILY
Qty: 90 CAPSULE | Refills: 0 | Status: SHIPPED | OUTPATIENT
Start: 2023-03-13

## 2023-03-13 RX ORDER — GLIPIZIDE 5 MG/1
5 TABLET ORAL
Qty: 180 TABLET | Refills: 0 | Status: SHIPPED | OUTPATIENT
Start: 2023-03-13

## 2023-03-13 RX ORDER — SPIRONOLACTONE 25 MG/1
25 TABLET ORAL DAILY
Qty: 30 TABLET | Refills: 0 | Status: SHIPPED | OUTPATIENT
Start: 2023-03-13

## 2023-03-13 RX ORDER — EZETIMIBE 10 MG/1
10 TABLET ORAL DAILY
Qty: 30 TABLET | Refills: 5 | Status: SHIPPED | OUTPATIENT
Start: 2023-03-13

## 2023-03-13 RX ORDER — LISINOPRIL 5 MG/1
5 TABLET ORAL DAILY
Qty: 30 TABLET | Refills: 5 | Status: SHIPPED | OUTPATIENT
Start: 2023-03-13 | End: 2023-03-13 | Stop reason: SDUPTHER
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
## 2023-03-21 ENCOUNTER — SOCIAL WORK (OUTPATIENT)
Dept: BEHAVIORAL/MENTAL HEALTH CLINIC | Facility: CLINIC | Age: 68
End: 2023-03-21

## 2023-03-21 DIAGNOSIS — F43.20 ADJUSTMENT DISORDER, UNSPECIFIED TYPE: Primary | ICD-10-CM

## 2023-03-30 NOTE — PSYCH
"This note was not shared with the patient due to this is a psychotherapy note   Behavioral Health Psychotherapy Progress Note    Psychotherapy Provided: Individual Psychotherapy     1  Adjustment disorder, unspecified type              DATA: The undersigned therapist met with the above patient for our scheduled session  Austyn Jin reports Cecilia's cancer, hospital recently two times  Austyn Jin continues to care for Teja Nowak after being 32 years together and continues to experience stress over how Teja Artist treats him  The undersigned therapist encouraged couples counseling as he feels he's \"tippie toed,\" around her his whole life  Austyn Jin reports his left eye has been blurry and we discussed diabetes risks and encouraged he make an appointment with his PCP  Austyn Jin continues to feel stressed/overwhelmed with Cecilia's current health, but wanting to give her the best time until she passes  Austyn Jin denies suicidal intent, gesture or ideation at this time  During this session, this clinician used the following therapeutic modalities: Cognitive Behavioral Therapy and Supportive Psychotherapy    Substance Abuse was not addressed during this session  If the client is diagnosed with a co-occurring substance use disorder, please indicate any changes in the frequency or amount of use: N/A   Stage of change for addressing substance use diagnoses: No substance use/Not applicable    ASSESSMENT:  Niki Gallardo presents with a Anxious mood  his affect is Normal range and intensity, which is congruent, with his mood and the content of the session  The client has not made progress on their goals  Niki Gallardo presents with a none risk of suicide, none risk of self-harm, and none risk of harm to others  For any risk assessment that surpasses a \"low\" rating, a safety plan must be developed  A safety plan was indicated: no  If yes, describe in detail N/A - Austyn Jin denies suicidal intent, gesture or ideation at this time          PLAN: " Between sessions, Maria E Diehl will meet for our next scheduled appointment and recommended to bring Haim Woodall to work on communication  At the next session, the therapist will use Cognitive Behavioral Therapy and Supportive Psychotherapy to address his current stressors        Visit start and stop times:    03/30/23  Start Time: 1130  Stop Time: 1230  Total Visit Time: 60 minutes

## 2023-04-04 ENCOUNTER — SOCIAL WORK (OUTPATIENT)
Dept: BEHAVIORAL/MENTAL HEALTH CLINIC | Facility: CLINIC | Age: 68
End: 2023-04-04

## 2023-04-04 DIAGNOSIS — F43.20 ADJUSTMENT DISORDER, UNSPECIFIED TYPE: Primary | ICD-10-CM

## 2023-04-07 NOTE — PSYCH
This note was not shared with the patient due to this is a psychotherapy note   Behavioral Health Psychotherapy Progress Note    Psychotherapy Provided: Individual Psychotherapy     1  Adjustment disorder, unspecified type            DATA: The undersigned therapist met with the above patient for our scheduled session  For today's session, Scott Cartwright continues to express feeling overwhelmed, defeated and frustrated with how his partner Aretha Manriquez is treating him  However, Aretha Manriquez has stage 4 cancer and Scott Cartwright continues to feel obligated to be there as a support during this difficult time  Scott Cartwright expressed feeling upset and hurt when Aretha Manriquez says hurtful things, and we discussed expressing his feelings to her however, he doesn't feel comfortable or confident  Scott Cartwright continues to avoid change and states he just wants to give her a good few years or months  However, his behavior is not really congruent with what he's saying as he reports he explodes or sometimes leaves when upset  Scott Cartwright continues to express feeling pain in his legs, and overall not feeling motivated or energized to do much  Scott Cartwright appears depressed given the circumstances and his current health  Scott Cartwright  denies suicidal intent, gesture or ideation at this time  During this session, this clinician used the following therapeutic modalities: Cognitive Behavioral Therapy and Supportive Psychotherapy    Substance Abuse was addressed during this session  If the client is diagnosed with a co-occurring substance use disorder, please indicate any changes in the frequency or amount of use: NONE at the moment  History of using opiates and abusing substances 8 years ago  Stage of change for addressing substance use diagnoses: Action    ASSESSMENT:  Sherif Guzman presents with a Euthymic/ normal and Dysthymic mood  his affect is Normal range and intensity, which is congruent, with his mood and the content of the session   The client has not made progress on their "goals  Laurita Leonardo presents with a none risk of suicide, none risk of self-harm, and none risk of harm to others  For any risk assessment that surpasses a \"low\" rating, a safety plan must be developed  A safety plan was indicated: no  If yes, describe in detail N A    PLAN: Between sessions, Laurita Leonardo will encourage Cecilia to come to therapy with him, decide if he wants to change the dynamic  At the next session, the therapist will use Cognitive Behavioral Therapy to address his current stressors      Visit start and stop times:    04/07/23  Start Time: 1130  Stop Time: 1230  Total Visit Time: 60 minutes  "

## 2023-04-19 PROBLEM — E66.01 OBESITY, MORBID (HCC): Status: ACTIVE | Noted: 2023-04-19

## 2023-05-04 ENCOUNTER — OFFICE VISIT (OUTPATIENT)
Dept: FAMILY MEDICINE CLINIC | Facility: CLINIC | Age: 68
End: 2023-05-04

## 2023-05-04 VITALS
SYSTOLIC BLOOD PRESSURE: 120 MMHG | HEART RATE: 82 BPM | BODY MASS INDEX: 35 KG/M2 | DIASTOLIC BLOOD PRESSURE: 76 MMHG | WEIGHT: 250 LBS | RESPIRATION RATE: 18 BRPM | TEMPERATURE: 96.8 F | HEIGHT: 71 IN

## 2023-05-04 DIAGNOSIS — T14.8XXA WOUND, OPEN: ICD-10-CM

## 2023-05-04 DIAGNOSIS — M54.50 CHRONIC LOW BACK PAIN, UNSPECIFIED BACK PAIN LATERALITY, UNSPECIFIED WHETHER SCIATICA PRESENT: ICD-10-CM

## 2023-05-04 DIAGNOSIS — E11.40 TYPE 2 DIABETES MELLITUS WITH DIABETIC NEUROPATHY, WITH LONG-TERM CURRENT USE OF INSULIN (HCC): ICD-10-CM

## 2023-05-04 DIAGNOSIS — Z01.818 PREOP EXAMINATION: Primary | ICD-10-CM

## 2023-05-04 DIAGNOSIS — Z79.4 TYPE 2 DIABETES MELLITUS WITH DIABETIC NEUROPATHY, WITH LONG-TERM CURRENT USE OF INSULIN (HCC): ICD-10-CM

## 2023-05-04 DIAGNOSIS — M86.9 OSTEOMYELITIS OF RIGHT FOOT, UNSPECIFIED TYPE (HCC): ICD-10-CM

## 2023-05-04 DIAGNOSIS — I73.9 PAD (PERIPHERAL ARTERY DISEASE) (HCC): ICD-10-CM

## 2023-05-04 DIAGNOSIS — G89.29 CHRONIC LOW BACK PAIN, UNSPECIFIED BACK PAIN LATERALITY, UNSPECIFIED WHETHER SCIATICA PRESENT: ICD-10-CM

## 2023-05-04 RX ORDER — SYRINGE AND NEEDLE,INSULIN,1ML 29 G X1/2"
SYRINGE, EMPTY DISPOSABLE MISCELLANEOUS
COMMUNITY
Start: 2023-04-25

## 2023-05-04 RX ORDER — PREGABALIN 300 MG/1
300 CAPSULE ORAL 2 TIMES DAILY
Qty: 60 CAPSULE | Refills: 1 | Status: SHIPPED | OUTPATIENT
Start: 2023-05-04

## 2023-05-04 RX ORDER — PHENTERMINE HYDROCHLORIDE 37.5 MG/1
TABLET ORAL
COMMUNITY
Start: 2023-05-01

## 2023-05-04 NOTE — PROGRESS NOTES
Chief Complaint   Patient presents with    Pre-op Exam     Pre op stent surgery 05/12    Back Pain        Patient ID: Onur Pedro is a 76 y o  male  HPI  Pt is seeing for preop exam prior to RLE angioplasty  -  Has unhealed R foot wound and currently treated with IV AB daily for osteomyelitis  -  Did not call for [facility] f/u mauri    The following portions of the patient's history were reviewed and updated as appropriate: allergies, current medications, past family history, past medical history, past social history, past surgical history and problem list     Review of Systems   Constitutional: Negative  Respiratory: Negative  Cardiovascular: Negative  Gastrointestinal: Negative  Genitourinary: Negative  Musculoskeletal: Positive for back pain (not better on Neurontin 100 mg TID )  Skin: Positive for wound  Neurological: Negative          Current Outpatient Medications   Medication Sig Dispense Refill    Accu-Chek Guide test strip       aspirin (ECOTRIN LOW STRENGTH) 81 mg EC tablet Take 81 mg by mouth daily      carvedilol (COREG) 6 25 mg tablet Take 1 tablet (6 25 mg total) by mouth 2 (two) times a day with meals 60 tablet 5    Ertapenem Sodium (ERTAPENEM 1000 MG IN SODIUM CHLORIDE 0 9% 50 ML,CMPD ORDER,) Inject 1,000 mg into a catheter in a vein every 24 hours      furosemide (LASIX) 40 mg tablet Take 1 tablet (40 mg total) by mouth daily as needed (Leg swelling) 30 tablet 0    glipiZIDE (GLUCOTROL) 5 mg tablet Take 1 tablet (5 mg total) by mouth 2 (two) times a day before meals 180 tablet 0    ibuprofen (MOTRIN) 600 mg tablet Take 1 tablet (600 mg total) by mouth every 8 (eight) hours as needed for mild pain or moderate pain 60 tablet 0    insulin aspart protamine-insulin aspart (NovoLOG 70/30) 100 units/mL injection Inject 50 Units under the skin 2 (two) times a day before meals      insulin NPH-insulin regular (NovoLIN 70/30) 100 units/mL subcutaneous injection Inject 65 Units under "the skin 2 (two) times a day      lisinopril (ZESTRIL) 5 mg tablet Take 1 tablet (5 mg total) by mouth daily 30 tablet 5    metFORMIN (GLUCOPHAGE) 500 mg tablet Take 1 tablet (500 mg total) by mouth 2 (two) times a day with meals 180 tablet 0    phentermine (ADIPEX-P) 37 5 MG tablet       pregabalin (LYRICA) 100 mg capsule Take 1 capsule (100 mg total) by mouth 3 (three) times a day 90 capsule 0    RELION INSULIN SYRINGE 1ML/31G 31G X 5/16\" 1 ML MISC USE TWICE DAILY WITH INSULIN      silver sulfadiazine (SILVADENE,SSD) 1 % cream Apply topically daily 50 g 0    spironolactone (ALDACTONE) 25 mg tablet Take 1 tablet (25 mg total) by mouth daily 30 tablet 5    ezetimibe (ZETIA) 10 mg tablet Take 1 tablet (10 mg total) by mouth daily 30 tablet 5     No current facility-administered medications for this visit  Objective:    /76 (BP Location: Left arm, Patient Position: Sitting, Cuff Size: Large)   Pulse 82   Temp (!) 96 8 °F (36 °C)   Resp 18   Ht 5' 11\" (1 803 m)   Wt 113 kg (250 lb)   BMI 34 87 kg/m²        Physical Exam  Constitutional:       General: He is not in acute distress  Appearance: He is obese  He is not ill-appearing  Cardiovascular:      Rate and Rhythm: Normal rate and regular rhythm  Heart sounds: No murmur heard  Pulmonary:      Effort: Pulmonary effort is normal  No respiratory distress  Breath sounds: No wheezing, rhonchi or rales  Abdominal:      Palpations: Abdomen is soft  Tenderness: There is no abdominal tenderness  Musculoskeletal:      Left lower leg: No edema  Skin:     Comments: R foot dressing   Neurological:      General: No focal deficit present  Mental Status: He is alert and oriented to person, place, and time  Psychiatric:         Mood and Affect: Mood normal            Labs in chart were reviewed        Assessment/Plan:         Diagnoses and all orders for this visit:    Preop examination  -     POCT ECG  Cleared for surgery " "  Type 2 diabetes mellitus with diabetic neuropathy, with long-term current use of insulin (HCC)  BG at home in 100-150s   Wound, open   was give # for [facility] in [facility]  Osteomyelitis of right foot, unspecified type ([address] )  Cont med  PAD (peripheral artery disease) (HCC)    Chronic low back pain, unspecified back pain laterality, unspecified whether sciatica present  -   Will increase med dose  -   pregabalin (LYRICA) 300 MG capsule; Take 1 capsule (300 mg total) by mouth 2 (two) times a day    Other orders  -     RELION INSULIN SYRINGE 1ML/31G 31G X 5/16\" 1 ML MISC; USE TWICE DAILY WITH INSULIN  -     phentermine (ADIPEX-P) 37 5 MG tablet  -     insulin NPH-insulin regular (NovoLIN 70/30) 100 units/mL subcutaneous injection; Inject 65 Units under the skin 2 (two) times a day  -     Ertapenem Sodium (ERTAPENEM 1000 MG IN SODIUM CHLORIDE 0 9% 50 ML,CMPD ORDER,);  Inject 1,000 mg into a catheter in a vein every 24 hours            rto in 2 m for Marylee Formica, MD      "

## 2023-05-17 ENCOUNTER — OFFICE VISIT (OUTPATIENT)
Dept: FAMILY MEDICINE CLINIC | Facility: CLINIC | Age: 68
End: 2023-05-17
Payer: COMMERCIAL

## 2023-05-17 VITALS
RESPIRATION RATE: 18 BRPM | HEIGHT: 71 IN | TEMPERATURE: 98 F | DIASTOLIC BLOOD PRESSURE: 80 MMHG | HEART RATE: 84 BPM | BODY MASS INDEX: 36.26 KG/M2 | WEIGHT: 259 LBS | SYSTOLIC BLOOD PRESSURE: 138 MMHG

## 2023-05-17 DIAGNOSIS — M79.671 PAIN IN RIGHT FOOT: Primary | ICD-10-CM

## 2023-05-17 DIAGNOSIS — M86.9 OSTEOMYELITIS OF RIGHT FOOT, UNSPECIFIED TYPE (HCC): ICD-10-CM

## 2023-05-17 DIAGNOSIS — Z79.4 TYPE 2 DIABETES MELLITUS WITH DIABETIC NEUROPATHY, WITH LONG-TERM CURRENT USE OF INSULIN (HCC): ICD-10-CM

## 2023-05-17 DIAGNOSIS — I25.10 CAD S/P PERCUTANEOUS CORONARY ANGIOPLASTY: ICD-10-CM

## 2023-05-17 DIAGNOSIS — R60.0 EDEMA OF BOTH LEGS: ICD-10-CM

## 2023-05-17 DIAGNOSIS — E11.40 TYPE 2 DIABETES MELLITUS WITH DIABETIC NEUROPATHY, WITH LONG-TERM CURRENT USE OF INSULIN (HCC): ICD-10-CM

## 2023-05-17 DIAGNOSIS — E78.5 DYSLIPIDEMIA: ICD-10-CM

## 2023-05-17 DIAGNOSIS — Z98.61 CAD S/P PERCUTANEOUS CORONARY ANGIOPLASTY: ICD-10-CM

## 2023-05-17 PROCEDURE — 99213 OFFICE O/P EST LOW 20 MIN: CPT | Performed by: FAMILY MEDICINE

## 2023-05-17 RX ORDER — OXYCODONE HYDROCHLORIDE AND ACETAMINOPHEN 5; 325 MG/1; MG/1
1 TABLET ORAL EVERY 4 HOURS PRN
Qty: 30 TABLET | Refills: 0 | Status: SHIPPED | OUTPATIENT
Start: 2023-05-17 | End: 2023-05-22

## 2023-05-17 RX ORDER — GLIPIZIDE 5 MG/1
5 TABLET ORAL
Qty: 180 TABLET | Refills: 0 | Status: SHIPPED | OUTPATIENT
Start: 2023-05-17

## 2023-05-17 RX ORDER — FUROSEMIDE 40 MG/1
40 TABLET ORAL DAILY PRN
Qty: 30 TABLET | Refills: 5 | Status: SHIPPED | OUTPATIENT
Start: 2023-05-17

## 2023-05-17 RX ORDER — EZETIMIBE 10 MG/1
10 TABLET ORAL DAILY
Qty: 30 TABLET | Refills: 5 | Status: SHIPPED | OUTPATIENT
Start: 2023-05-17

## 2023-05-17 NOTE — PROGRESS NOTES
Chief Complaint   Patient presents with   • Toe Pain     Patient complains of right great toe pain x 2 weeks        Patient ID: Nancy Schwartz is a 76 y o  male  HPI  Pt is seeing for pain meds request for R great toe pain -  Was Dx with osteomyelitis recently -  under podiatrist and  [facility] care -  Being Tx with IV AB to complete 6 wks of therapy     The following portions of the patient's history were reviewed and updated as appropriate: allergies, current medications, past family history, past medical history, past social history, past surgical history and problem list     Review of Systems   Constitutional: Negative  Respiratory: Negative  Cardiovascular: Negative  Gastrointestinal: Negative  Genitourinary: Negative  Musculoskeletal: Positive for arthralgias and back pain (not better on Neurontin 100 mg TID )  Skin: Positive for wound  Neurological: Negative          Current Outpatient Medications   Medication Sig Dispense Refill   • Accu-Chek Guide test strip      • aspirin (ECOTRIN LOW STRENGTH) 81 mg EC tablet Take 81 mg by mouth daily     • carvedilol (COREG) 6 25 mg tablet Take 1 tablet (6 25 mg total) by mouth 2 (two) times a day with meals 60 tablet 5   • Ertapenem Sodium (ERTAPENEM 1000 MG IN SODIUM CHLORIDE 0 9% 50 ML,CMPD ORDER,) Inject 1,000 mg into a catheter in a vein every 24 hours     • ezetimibe (ZETIA) 10 mg tablet Take 1 tablet (10 mg total) by mouth daily 30 tablet 5   • furosemide (LASIX) 40 mg tablet Take 1 tablet (40 mg total) by mouth daily as needed (Leg swelling) 30 tablet 0   • glipiZIDE (GLUCOTROL) 5 mg tablet Take 1 tablet (5 mg total) by mouth 2 (two) times a day before meals 180 tablet 0   • ibuprofen (MOTRIN) 600 mg tablet Take 1 tablet (600 mg total) by mouth every 8 (eight) hours as needed for mild pain or moderate pain 60 tablet 0   • insulin NPH-insulin regular (NovoLIN 70/30) 100 units/mL subcutaneous injection Inject 60 Units under the skin 2 (two) times a "day     • lisinopril (ZESTRIL) 5 mg tablet Take 1 tablet (5 mg total) by mouth daily 30 tablet 5   • metFORMIN (GLUCOPHAGE) 500 mg tablet Take 1 tablet (500 mg total) by mouth 2 (two) times a day with meals 180 tablet 0   • pregabalin (LYRICA) 300 MG capsule Take 1 capsule (300 mg total) by mouth 2 (two) times a day 60 capsule 1   • RELION INSULIN SYRINGE 1ML/31G 31G X 5/16\" 1 ML MISC USE TWICE DAILY WITH INSULIN     • spironolactone (ALDACTONE) 25 mg tablet Take 1 tablet (25 mg total) by mouth daily 30 tablet 5   • insulin aspart protamine-insulin aspart (NovoLOG 70/30) 100 units/mL injection Inject 50 Units under the skin 2 (two) times a day before meals (Patient not taking: Reported on 5/17/2023)     • silver sulfadiazine (SILVADENE,SSD) 1 % cream Apply topically daily (Patient not taking: Reported on 5/17/2023) 50 g 0     No current facility-administered medications for this visit  Objective:    /80 (BP Location: Left arm, Patient Position: Sitting, Cuff Size: Large)   Pulse 84   Temp 98 °F (36 7 °C)   Resp 18   Ht 5' 11\" (1 803 m)   Wt 117 kg (259 lb)   BMI 36 12 kg/m²        Physical Exam  Constitutional:       Appearance: He is obese  He is not ill-appearing  Cardiovascular:      Rate and Rhythm: Normal rate and regular rhythm  Pulmonary:      Effort: Pulmonary effort is normal  No respiratory distress  Skin:     Comments: Dressing over R foot    Neurological:      General: No focal deficit present  Mental Status: He is alert and oriented to person, place, and time  Assessment/Plan:         Diagnoses and all orders for this visit:    Pain in right foot  -     oxyCODONE-acetaminophen (Percocet) 5-325 mg per tablet;  Take 1 tablet by mouth every 4 (four) hours as needed for moderate pain for up to 5 days Max Daily Amount: 6 tablets    Osteomyelitis of right foot, unspecified type ([address] )    F/u with specialists       rto prn                       Zurdo Purcell, " MD

## 2023-05-20 DIAGNOSIS — M54.32 SCIATICA, LEFT SIDE: ICD-10-CM

## 2023-05-22 RX ORDER — IBUPROFEN 600 MG/1
600 TABLET ORAL EVERY 8 HOURS PRN
Qty: 60 TABLET | Refills: 0 | Status: SHIPPED | OUTPATIENT
Start: 2023-05-22

## 2023-05-23 ENCOUNTER — OFFICE VISIT (OUTPATIENT)
Dept: CARDIOLOGY CLINIC | Facility: CLINIC | Age: 68
End: 2023-05-23

## 2023-05-23 VITALS
BODY MASS INDEX: 36.26 KG/M2 | SYSTOLIC BLOOD PRESSURE: 120 MMHG | HEIGHT: 71 IN | HEART RATE: 85 BPM | WEIGHT: 259 LBS | OXYGEN SATURATION: 93 % | DIASTOLIC BLOOD PRESSURE: 70 MMHG

## 2023-05-23 DIAGNOSIS — I25.10 CAD S/P PERCUTANEOUS CORONARY ANGIOPLASTY: ICD-10-CM

## 2023-05-23 DIAGNOSIS — I10 ESSENTIAL HYPERTENSION: ICD-10-CM

## 2023-05-23 DIAGNOSIS — M54.42 ACUTE MIDLINE LOW BACK PAIN WITH LEFT-SIDED SCIATICA: ICD-10-CM

## 2023-05-23 DIAGNOSIS — Z98.61 CAD S/P PERCUTANEOUS CORONARY ANGIOPLASTY: ICD-10-CM

## 2023-05-23 DIAGNOSIS — R06.09 DYSPNEA ON EXERTION: Primary | ICD-10-CM

## 2023-05-23 DIAGNOSIS — E78.5 DYSLIPIDEMIA: ICD-10-CM

## 2023-05-23 DIAGNOSIS — E66.9 CLASS 2 OBESITY: ICD-10-CM

## 2023-05-23 DIAGNOSIS — Z79.4 TYPE 2 DIABETES MELLITUS WITH HYPEROSMOLARITY WITHOUT COMA, WITH LONG-TERM CURRENT USE OF INSULIN (HCC): ICD-10-CM

## 2023-05-23 DIAGNOSIS — E11.00 TYPE 2 DIABETES MELLITUS WITH HYPEROSMOLARITY WITHOUT COMA, WITH LONG-TERM CURRENT USE OF INSULIN (HCC): ICD-10-CM

## 2023-05-23 DIAGNOSIS — R60.0 EDEMA OF BOTH LEGS: ICD-10-CM

## 2023-05-23 DIAGNOSIS — T46.6X5A MYALGIA DUE TO STATIN: ICD-10-CM

## 2023-05-23 DIAGNOSIS — M79.10 MYALGIA DUE TO STATIN: ICD-10-CM

## 2023-05-23 DIAGNOSIS — I25.2 HISTORY OF ANTERIOR WALL MYOCARDIAL INFARCTION: ICD-10-CM

## 2023-05-23 DIAGNOSIS — M86.9 OSTEOMYELITIS OF GREAT TOE OF RIGHT FOOT (HCC): ICD-10-CM

## 2023-05-23 PROBLEM — E66.812 CLASS 2 OBESITY: Status: ACTIVE | Noted: 2023-05-23

## 2023-05-23 RX ORDER — INSULIN ASPART 100 [IU]/ML
60 INJECTION, SUSPENSION SUBCUTANEOUS
Qty: 10 ML | Refills: 5
Start: 2023-05-23 | End: 2023-05-23 | Stop reason: SDUPTHER

## 2023-05-23 RX ORDER — ATORVASTATIN CALCIUM 40 MG/1
40 TABLET, FILM COATED ORAL DAILY
Qty: 30 TABLET | Refills: 5 | Status: SHIPPED | OUTPATIENT
Start: 2023-05-23

## 2023-05-23 NOTE — PATIENT INSTRUCTIONS
Schedule echocardiogram to double check heart function at [facility]  We will give you a requisition slip requesting that study  Continue present medication, except  new prescription for atorvastatin 40 mg and use up your 80 mg tablets by cutting them in half and taking half a tablet daily     Cardiology follow-up approximately 1 year with EKG  Go for blood work previously ordered by Dr Charles Hollis in late July, 2023

## 2023-05-23 NOTE — PROGRESS NOTES
Office Cardiology Progress Note  Claudia Arrieta 76 y o  male MRN: [id number]  05/23/23  10:13 AM      ASSESSMENT:    1   Persistent chronic stable exertional dyspnea and worsening bilateral lower extremity edema with otherwise stable CAD with anterior wall myocardial infarction and PCI of LAD 03/17/2014-[address] and had [facility] 3 0 x 30 mm drug-eluting stent placed  Had completely normal Lexiscan nuclear stress study on 04/12/2021    2  Longstanding insulin-dependent diabetes mellitus, type 2  complicated by diabetic polyneuropathy of both feet  Last known A1c and estimated average glucose on 04/19/2023 was 9 5% and 232 [id number]    3  Controlled essential hypertension    4  Currently untreated dyslipidemia for which the patient takes atorvastatin only sporadically because of muscle cramps  Last LDL cholesterol on 04/02/2021 was suboptimum at 117    5  Recurrent cellulitis of bilateral pretibial regions, but none recently  Has worsening 1-2+ bilateral pretibial and ankle edema, probably related to progressive obesity with heart failure as contributing factor to be excluded  6  Gangrene with open wound and osteomyelitis of right great toe under treatment by [facility] with daily IV antibiotics by PICC line and apparent PTA of right lower extremity on 5/19 6/23   7  History of insomnia and daytime fatigue with consideration of obstructive sleep apnea  8  History of abdominal aortic aneurysm , but we have no documentation of basis for diagnosis  9  Former chronic smoking of 1 pack per day for 43 years with none for 9 years  10  Stable obesity, class 2 with approximate 45 7 pound weight gain in the past 5 8 years and 10 pound weight gain in approximately 2 3 years  Has generally unhealthy diet  11   COVID vaccine series declined  12   Recent exacerbation of low back pain with left-sided sciatica  Plan       Patient Instructions     1   Schedule echocardiogram "to double check heart function at [facility]  We will give you a requisition slip requesting that study  2  Continue present medication, except  new prescription for atorvastatin 40 mg and use up your 80 mg tablets by cutting them in half and taking half a tablet daily     3  Cardiology follow-up approximately 1 year with EKG  4  Go for blood work previously ordered by Dr Sheng Parra in late July, 2023  HPI    This 76 y o  male  denies new cardiopulmonary and medical symptoms  The patient has a stable degree of exertional dyspnea when he walks any significant distance or upgrade  He complains of progressive edema of his bilateral lower extremities requiring the use of nearly daily furosemide  The patient denies any recent chest discomfort, syncope, dizziness, lightheadedness, palpitations, orthopnea, paroxysmal nocturnal dyspnea, cough, sputum production, wheezing, fever or chills  He has been bothered by recent exacerbation of chronic low back pain with left-sided sciatica radiating all the way to the left foot and also has chronic right great toe pain being managed by [address] [place] [address] at [facility] for gangrene and cellulitis of the right great toe with an open wound  The patient just underwent angiography and possible PTA of the right lower extremity on 5/19/2023, but that report is not yet available in his chart  He has been getting daily IV antibiotics via PICC line for the past month at [facility]  The patient also complains of intermittent chronic \"charley horses in his legs, which she attributes to atorvastatin, which he has stopped taking on a consistent basis  The patient also complains of chronic but stable nocturia  He has recently been started on metformin by his primary care physician      The patient retired at age 58 and resides in [facility] with the [place], [place] address and lives with his long-term girlfriend of 35 " years  He has grown children and grandchildren, and his son would serve as his medical decision-maker if the patient were unable to make medical decisions  The patient denies tobacco, alcohol, or illicit drug use  Encounter Diagnoses   Name Primary? • Dyspnea on exertion Yes   • Edema of both legs    • CAD S/P percutaneous coronary angioplasty    • Dyslipidemia    • Essential hypertension    • History of anterior wall myocardial infarction    • Type 2 diabetes mellitus with hyperosmolarity without coma, with long-term current use of insulin (HCC)    • Class 2 obesity    • Osteomyelitis of great toe of right foot (HCC)    • Myalgia due to statin    • Acute midline low back pain with left-sided sciatica         Review of Systems    All other systems negative, except as noted in history of present illness    Historical Information   Past Medical History:   Diagnosis Date   • Diabetes mellitus ([facility] [address] )    • Hypertension    • Obesity      Past Surgical History:   Procedure Laterality Date   • CORONARY ANGIOPLASTY WITH STENT PLACEMENT     • ROTATOR CUFF REPAIR Right      Social History     Substance and Sexual Activity   Alcohol Use Not Currently     Social History     Substance and Sexual Activity   Drug Use Never     Social History     Tobacco Use   Smoking Status Former   Smokeless Tobacco Never       Family History:  Family History   Problem Relation Age of Onset   • Dementia Mother    • Diabetes Father          Meds/Allergies     Prior to Admission medications    Medication Sig Start Date End Date Taking?  Authorizing Provider   Accu-Chek Guide test strip  11/16/21  Yes Historical Provider, MD   aspirin (ECOTRIN LOW STRENGTH) 81 mg EC tablet Take 81 mg by mouth daily   Yes Historical Provider, MD   atorvastatin (LIPITOR) 40 mg tablet Take 1 tablet (40 mg total) by mouth daily 5/23/23  Yes Óscar Deleon MD   carvedilol (COREG) 6 25 mg tablet Take 1 tablet (6 25 mg total) by mouth 2 (two) times a day with "meals 4/13/23  Yes Cristina Ferreira MD   Ertapenem Sodium (ERTAPENEM 1000 MG IN SODIUM CHLORIDE 0 9% 50 ML,CMPD ORDER,) Inject 1,000 mg into a catheter in a vein every 24 hours 4/25/23 5/30/23 Yes Historical Provider, MD   ezetimibe (ZETIA) 10 mg tablet Take 1 tablet (10 mg total) by mouth daily 5/17/23  Yes Cristina Ferreira MD   furosemide (LASIX) 40 mg tablet Take 1 tablet (40 mg total) by mouth daily as needed (Leg swelling) 5/17/23  Yes Cristina Ferreira MD   glipiZIDE (GLUCOTROL) 5 mg tablet Take 1 tablet (5 mg total) by mouth 2 (two) times a day before meals 5/17/23  Yes Shalonda Leonard MD   ibuprofen (MOTRIN) 600 mg tablet Take 1 tablet (600 mg total) by mouth every 8 (eight) hours as needed for mild pain or moderate pain 5/22/23  Yes Shalonda Leonard MD   insulin aspart protamine-insulin aspart (NovoLOG 70/30) 100 units/mL injection Inject 60 Units under the skin 2 (two) times a day before meals 5/23/23  Yes Cristina Ferreira MD   lisinopril (ZESTRIL) 5 mg tablet Take 1 tablet (5 mg total) by mouth daily 4/13/23  Yes Cristina Ferreira MD   metFORMIN (GLUCOPHAGE) 500 mg tablet Take 1 tablet (500 mg total) by mouth 2 (two) times a day with meals 4/15/23  Yes Shalonda Leonard MD   oxyCODONE-acetaminophen (Percocet) 5-325 mg per tablet Take 1 tablet by mouth every 4 (four) hours as needed for moderate pain for up to 5 days Max Daily Amount: 6 tablets 5/17/23 5/22/23  Shalonda Leonard MD   pregabalin (LYRICA) 300 MG capsule Take 1 capsule (300 mg total) by mouth 2 (two) times a day 5/4/23  Yes Shalonda Leonard MD   RELION INSULIN SYRINGE 1ML/31G 31G X 5/16\" 1 ML MISC USE TWICE DAILY WITH INSULIN 4/25/23  Yes Historical Provider, MD   silver sulfadiazine (SILVADENE,SSD) 1 % cream Apply topically daily as needed for wound care (as needed for wound care) 5/23/23  Yes Cristina Ferreira MD   spironolactone (ALDACTONE) 25 mg tablet Take 1 tablet (25 mg total) by mouth daily 4/13/23  Yes Cristina Ferreira MD " "  insulin NPH-insulin regular (NovoLIN 70/30) 100 units/mL subcutaneous injection Inject 60 Units under the skin 2 (two) times a day 4/25/23 5/23/23 Yes Historical Provider, MD   insulin aspart protamine-insulin aspart (NovoLOG 70/30) 100 units/mL injection Inject 50 Units under the skin 2 (two) times a day before meals  Patient not taking: Reported on 5/17/2023 5/23/23  Historical Provider, MD   silver sulfadiazine (SILVADENE,SSD) 1 % cream Apply topically daily  Patient not taking: Reported on 5/17/2023 9/27/22 5/23/23  Faustina Gallardo MD       No Known Allergies      Vitals:    05/23/23 0814   BP: 120/70   BP Location: Right arm   Patient Position: Sitting   Cuff Size: Standard   Pulse: 85   SpO2: 93%   Weight: 117 kg (259 lb)   Height: 5' 11\" (1 803 m)       Body mass index is 36 12 kg/m²  4 pound weight loss in approximately 1 3 years with 10 pound weight gain in approximately 2 3 years and 45 7 pound weight gain in approximately 5 8 years    Physical Exam:    General Appearance:  Alert, cooperative, no distress, appears stated age and is moderately obese  Head:  Normocephalic, without obvious abnormality, atraumatic   Eyes:  PERRL, conjunctiva/corneas clear, EOM's intact,   both eyes   Ears:  Normal TM's and external ear canals, both ears   Nose: Nares normal, septum midline, mucosa normal, no drainage or sinus tenderness   Throat: Lips, mucosa, and tongue normal; teeth and gums normal   Neck: Supple, symmetrical, trachea midline, no adenopathy, thyroid: not enlarged, symmetric, no tenderness/mass/nodules, no carotid bruit or JVD   Back:   Symmetric, no curvature, ROM normal, no CVA tenderness   Lungs:   Clear to auscultation bilaterally, respirations unlabored   Chest Wall:  No tenderness or deformity   Heart:  Regular rate and rhythm, S1, S2 normal, no murmur, rub or gallop   Abdomen:   Soft, non-tender, bowel sounds active all four quadrants,  no masses, no organomegaly    Moderate abdominal obesity " noted    Extremities: Extremities reveal chronic nonpitting edema of bilateral lower extremities, right greater than left, along with 1-2+ pitting pretibial and ankle edema bilaterally, are atraumatic, no cyanosi  Pulses: Not readily palpated but both feet are equally warm  Skin: Skin showed normal color, texture, turgor and no rashes or lesions   Lymph nodes: Cervical, supraclavicular, and axillary nodes normal   Neurologic: Normal         Cardiographics    ECG 05/23/23:    Normal sinus rhythm with first-degree AV block and average heart rate of 85 bpm  Otherwise normal ECG with no major change from 5/4/2023 and 2/1/2022    IMAGING:    Chest x-ray, PA/lateral 5/4/2023:    Left upper extremity PICC line in proper position  No acute cardiopulmonary disease      Lower extremity arterial duplex scan 4/21/2023:    Normal bilateral ABIs of 1 48 on right and 1 45 on left  Arterial calcifications bilaterally  Biphasic and triphasic waveforms bilaterally    No Doppler findings for significant arterial stenosis of bilateral lower extremities  ABIs are thought to be nondiagnostic related to arterial calcification      LAB REVIEW:      Lab Results   Component Value Date    SODIUM 140 09/14/2022    K 4 7 09/14/2022     09/14/2022    CO2 26 09/14/2022    BUN 13 09/14/2022    CREATININE 0 99 09/14/2022    GLUF 269 (H) 09/14/2022    CALCIUM 9 7 09/14/2022    CORRECTEDCA 10 0 03/07/2022    AST 23 09/14/2022    ALT 54 09/14/2022    ALKPHOS 126 (H) 09/14/2022    EGFR 78 09/14/2022     Lab Results   Component Value Date    CHOLESTEROL 131 03/07/2022    CHOLESTEROL 192 04/02/2021     Lab Results   Component Value Date    HDL 44 03/07/2022    HDL 56 04/02/2021       Lab Results   Component Value Date    LDLCALC 72 03/07/2022    LDLCALC 117 (H) 04/02/2021     No components found for: [facility]  Lab Results   Component Value Date    TRIG 73 03/07/2022    TRIG 97 04/02/2021     CBC 5/18/2023: Normal  TSH, NT-BNP pro 3/7/2022: Normal  A1c 4/19/2023: 9 5%, increased from 9 3% on 3/7/2022  CMP 5/18/2023: Glucose 125 and otherwise normal      Because of complexity of intercurrent history since last visit of 2/1/2022, extensive review of pertinent records and extensive discussion with patient, this visit consumed 47 minutes of face-to-face time with patient           Mark Melendez MD

## 2023-05-24 RX ORDER — LISINOPRIL 5 MG/1
5 TABLET ORAL DAILY
Qty: 30 TABLET | Refills: 0 | Status: SHIPPED | OUTPATIENT
Start: 2023-05-24

## 2023-05-24 RX ORDER — INSULIN ASPART 100 [IU]/ML
60 INJECTION, SUSPENSION SUBCUTANEOUS
Qty: 10 ML | Refills: 0 | Status: SHIPPED | OUTPATIENT
Start: 2023-05-24

## 2023-05-24 RX ORDER — CARVEDILOL 6.25 MG/1
6.25 TABLET ORAL 2 TIMES DAILY WITH MEALS
Qty: 60 TABLET | Refills: 0 | Status: SHIPPED | OUTPATIENT
Start: 2023-05-24

## 2023-05-24 RX ORDER — SPIRONOLACTONE 25 MG/1
25 TABLET ORAL DAILY
Qty: 30 TABLET | Refills: 0 | Status: SHIPPED | OUTPATIENT
Start: 2023-05-24

## 2023-06-06 DIAGNOSIS — I10 ESSENTIAL HYPERTENSION: ICD-10-CM

## 2023-06-06 DIAGNOSIS — R60.0 EDEMA OF BOTH LEGS: ICD-10-CM

## 2023-06-06 RX ORDER — LISINOPRIL 5 MG/1
5 TABLET ORAL DAILY
Qty: 30 TABLET | Refills: 0 | Status: SHIPPED | OUTPATIENT
Start: 2023-06-06

## 2023-06-06 RX ORDER — FUROSEMIDE 40 MG/1
40 TABLET ORAL DAILY PRN
Qty: 30 TABLET | Refills: 0 | Status: SHIPPED | OUTPATIENT
Start: 2023-06-06

## 2023-06-14 ENCOUNTER — VBI (OUTPATIENT)
Dept: ADMINISTRATIVE | Facility: OTHER | Age: 68
End: 2023-06-14

## 2023-06-23 DIAGNOSIS — I25.2 HISTORY OF ANTERIOR WALL MYOCARDIAL INFARCTION: ICD-10-CM

## 2023-06-23 DIAGNOSIS — I25.10 CAD S/P PERCUTANEOUS CORONARY ANGIOPLASTY: ICD-10-CM

## 2023-06-23 DIAGNOSIS — Z98.61 CAD S/P PERCUTANEOUS CORONARY ANGIOPLASTY: ICD-10-CM

## 2023-06-23 DIAGNOSIS — E11.00 TYPE 2 DIABETES MELLITUS WITH HYPEROSMOLARITY WITHOUT COMA, WITH LONG-TERM CURRENT USE OF INSULIN (HCC): ICD-10-CM

## 2023-06-23 DIAGNOSIS — Z79.4 TYPE 2 DIABETES MELLITUS WITH HYPEROSMOLARITY WITHOUT COMA, WITH LONG-TERM CURRENT USE OF INSULIN (HCC): ICD-10-CM

## 2023-06-23 DIAGNOSIS — I10 ESSENTIAL HYPERTENSION: ICD-10-CM

## 2023-06-23 DIAGNOSIS — E78.5 DYSLIPIDEMIA: ICD-10-CM

## 2023-06-23 DIAGNOSIS — E11.40 TYPE 2 DIABETES MELLITUS WITH DIABETIC NEUROPATHY, WITH LONG-TERM CURRENT USE OF INSULIN (HCC): ICD-10-CM

## 2023-06-23 DIAGNOSIS — Z79.4 TYPE 2 DIABETES MELLITUS WITH DIABETIC NEUROPATHY, WITH LONG-TERM CURRENT USE OF INSULIN (HCC): ICD-10-CM

## 2023-06-23 PROCEDURE — 4010F ACE/ARB THERAPY RXD/TAKEN: CPT | Performed by: FAMILY MEDICINE

## 2023-06-23 RX ORDER — SPIRONOLACTONE 25 MG/1
25 TABLET ORAL DAILY
Qty: 30 TABLET | Refills: 5 | Status: SHIPPED | OUTPATIENT
Start: 2023-06-23

## 2023-06-23 RX ORDER — CARVEDILOL 6.25 MG/1
6.25 TABLET ORAL 2 TIMES DAILY WITH MEALS
Qty: 60 TABLET | Refills: 5 | Status: SHIPPED | OUTPATIENT
Start: 2023-06-23

## 2023-06-23 RX ORDER — ATORVASTATIN CALCIUM 40 MG/1
40 TABLET, FILM COATED ORAL DAILY
Qty: 30 TABLET | Refills: 5 | Status: SHIPPED | OUTPATIENT
Start: 2023-06-23

## 2023-06-23 RX ORDER — LISINOPRIL 5 MG/1
5 TABLET ORAL DAILY
Qty: 30 TABLET | Refills: 5 | Status: SHIPPED | OUTPATIENT
Start: 2023-06-23

## 2023-07-03 ENCOUNTER — OFFICE VISIT (OUTPATIENT)
Dept: FAMILY MEDICINE CLINIC | Facility: CLINIC | Age: 68
End: 2023-07-03
Payer: COMMERCIAL

## 2023-07-03 VITALS
RESPIRATION RATE: 18 BRPM | BODY MASS INDEX: 37.38 KG/M2 | WEIGHT: 267 LBS | SYSTOLIC BLOOD PRESSURE: 126 MMHG | TEMPERATURE: 97.7 F | HEIGHT: 71 IN | DIASTOLIC BLOOD PRESSURE: 64 MMHG | HEART RATE: 82 BPM

## 2023-07-03 DIAGNOSIS — E11.40 TYPE 2 DIABETES MELLITUS WITH DIABETIC NEUROPATHY, WITH LONG-TERM CURRENT USE OF INSULIN (HCC): Primary | ICD-10-CM

## 2023-07-03 DIAGNOSIS — M54.50 CHRONIC LOW BACK PAIN, UNSPECIFIED BACK PAIN LATERALITY, UNSPECIFIED WHETHER SCIATICA PRESENT: ICD-10-CM

## 2023-07-03 DIAGNOSIS — Z79.4 TYPE 2 DIABETES MELLITUS WITH DIABETIC NEUROPATHY, WITH LONG-TERM CURRENT USE OF INSULIN (HCC): Primary | ICD-10-CM

## 2023-07-03 DIAGNOSIS — G89.29 CHRONIC LOW BACK PAIN, UNSPECIFIED BACK PAIN LATERALITY, UNSPECIFIED WHETHER SCIATICA PRESENT: ICD-10-CM

## 2023-07-03 LAB — SL AMB POCT HEMOGLOBIN AIC: 10 (ref ?–6.5)

## 2023-07-03 PROCEDURE — 3074F SYST BP LT 130 MM HG: CPT | Performed by: FAMILY MEDICINE

## 2023-07-03 PROCEDURE — 3078F DIAST BP <80 MM HG: CPT | Performed by: FAMILY MEDICINE

## 2023-07-03 PROCEDURE — 99214 OFFICE O/P EST MOD 30 MIN: CPT | Performed by: FAMILY MEDICINE

## 2023-07-03 PROCEDURE — 1159F MED LIST DOCD IN RCRD: CPT | Performed by: FAMILY MEDICINE

## 2023-07-03 PROCEDURE — 83036 HEMOGLOBIN GLYCOSYLATED A1C: CPT | Performed by: FAMILY MEDICINE

## 2023-07-03 PROCEDURE — 1160F RVW MEDS BY RX/DR IN RCRD: CPT | Performed by: FAMILY MEDICINE

## 2023-07-03 RX ORDER — AMITRIPTYLINE HYDROCHLORIDE 10 MG/1
10 TABLET, FILM COATED ORAL
Qty: 90 TABLET | Refills: 1 | Status: SHIPPED | OUTPATIENT
Start: 2023-07-03

## 2023-07-03 RX ORDER — GLIPIZIDE 10 MG/1
10 TABLET ORAL
Qty: 180 TABLET | Refills: 0 | Status: SHIPPED | OUTPATIENT
Start: 2023-07-03

## 2023-07-03 NOTE — PROGRESS NOTES
Chief Complaint   Patient presents with   • Back Pain   • Diabetes        Patient ID: Ivonne Cheng is a 76 y.o. male. HPI  Pt is seeing for f/u DM2 -  Was Dx in "late 35s"  -  Was under endo care until recently -  Did not follow up for several months - does not check BG at home  -  Has chronic back pain with radiation to L leg - was seen by pain and ortho  specialists in the past     The following portions of the patient's history were reviewed and updated as appropriate: allergies, current medications, past family history, past medical history, past social history, past surgical history and problem list.    Review of Systems   Constitutional: Negative. Respiratory: Negative. Cardiovascular: Negative. Gastrointestinal: Negative. Genitourinary: Negative. Musculoskeletal: Positive for arthralgias and back pain. Negative for gait problem. Skin: Negative. Neurological: Negative. Psychiatric/Behavioral: Negative.         Current Outpatient Medications   Medication Sig Dispense Refill   • Accu-Chek Guide test strip      • aspirin (ECOTRIN LOW STRENGTH) 81 mg EC tablet Take 1 tablet (81 mg total) by mouth daily 30 tablet 0   • atorvastatin (LIPITOR) 40 mg tablet Take 1 tablet (40 mg total) by mouth daily 30 tablet 5   • carvedilol (COREG) 6.25 mg tablet Take 1 tablet (6.25 mg total) by mouth 2 (two) times a day with meals 60 tablet 5   • ezetimibe (ZETIA) 10 mg tablet Take 1 tablet (10 mg total) by mouth daily 30 tablet 5   • furosemide (LASIX) 40 mg tablet Take 1 tablet (40 mg total) by mouth daily as needed (Leg swelling) 30 tablet 0   • glipiZIDE (GLUCOTROL) 5 mg tablet Take 1 tablet (5 mg total) by mouth 2 (two) times a day before meals 180 tablet 0   • ibuprofen (MOTRIN) 600 mg tablet Take 1 tablet (600 mg total) by mouth every 8 (eight) hours as needed for mild pain or moderate pain 60 tablet 0   • insulin aspart protamine-insulin aspart (NovoLOG 70/30) 100 units/mL injection Inject 60 Units under the skin 2 (two) times a day before meals 10 mL 0   • lisinopril (ZESTRIL) 5 mg tablet Take 1 tablet (5 mg total) by mouth daily 30 tablet 5   • metFORMIN (GLUCOPHAGE) 500 mg tablet Take 1 tablet (500 mg total) by mouth 2 (two) times a day with meals 180 tablet 0   • pregabalin (LYRICA) 300 MG capsule Take 1 capsule (300 mg total) by mouth 2 (two) times a day 60 capsule 1   • RELION INSULIN SYRINGE 1ML/31G 31G X 5/16" 1 ML MISC USE TWICE DAILY WITH INSULIN     • silver sulfadiazine (SILVADENE,SSD) 1 % cream Apply topically daily as needed for wound care (as needed for wound care) 50 g 0   • spironolactone (ALDACTONE) 25 mg tablet Take 1 tablet (25 mg total) by mouth daily 30 tablet 5     No current facility-administered medications for this visit. Objective:    /64 (BP Location: Left arm, Patient Position: Sitting, Cuff Size: Large)   Pulse 82   Temp 97.7 °F (36.5 °C)   Resp 18   Ht 5' 11" (1.803 m)   Wt 121 kg (267 lb)   BMI 37.24 kg/m²        Physical Exam  Constitutional:       Appearance: He is obese. He is not ill-appearing. Cardiovascular:      Rate and Rhythm: Normal rate and regular rhythm. Pulmonary:      Effort: Pulmonary effort is normal. No respiratory distress. Breath sounds: No wheezing, rhonchi or rales. Musculoskeletal:      Lumbar back: Tenderness present. Decreased range of motion. Right lower leg: No edema. Left lower leg: No edema. Neurological:      Mental Status: He is alert and oriented to person, place, and time. Psychiatric:         Mood and Affect: Mood normal.         Thought Content: Thought content normal.         Judgment: Judgment normal.           Labs in chart were reviewed.   Recent Results (from the past 672 hour(s))   POCT hemoglobin A1c    Collection Time: 07/03/23 11:52 AM   Result Value Ref Range    Hemoglobin A1C 10.0 (A) 6.5       Assessment/Plan:         Diagnoses and all orders for this visit:    Type 2 diabetes mellitus with diabetic neuropathy, with long-term current use of insulin (HCC)  -     POCT hemoglobin A1c  -   Will increase med -   glipiZIDE (GLUCOTROL) 10 mg tablet; Take 1 tablet (10 mg total) by mouth 2 (two) times a day before meals  -     Ambulatory Referral to Endocrinology; Future    Chronic low back pain, unspecified back pain laterality, unspecified whether sciatica present  -     amitriptyline (ELAVIL) 10 mg tablet;  Take 1 tablet (10 mg total) by mouth daily at bedtime          rto in 3 m                 Christian Menon MD

## 2023-07-05 ENCOUNTER — TELEPHONE (OUTPATIENT)
Dept: ENDOCRINOLOGY | Facility: CLINIC | Age: 68
End: 2023-07-05

## 2023-07-05 NOTE — TELEPHONE ENCOUNTER
Received referral for [facility]. Left voicemail for patient to contact office to schedule Consult/New Patient Appointment.

## 2023-07-10 DIAGNOSIS — M54.50 CHRONIC LOW BACK PAIN, UNSPECIFIED BACK PAIN LATERALITY, UNSPECIFIED WHETHER SCIATICA PRESENT: ICD-10-CM

## 2023-07-10 DIAGNOSIS — G89.29 CHRONIC LOW BACK PAIN, UNSPECIFIED BACK PAIN LATERALITY, UNSPECIFIED WHETHER SCIATICA PRESENT: ICD-10-CM

## 2023-07-11 RX ORDER — PREGABALIN 300 MG/1
CAPSULE ORAL
Qty: 60 CAPSULE | Refills: 1 | Status: SHIPPED | OUTPATIENT
Start: 2023-07-11

## 2023-07-20 DIAGNOSIS — I25.10 CAD S/P PERCUTANEOUS CORONARY ANGIOPLASTY: ICD-10-CM

## 2023-07-20 DIAGNOSIS — E78.5 DYSLIPIDEMIA: ICD-10-CM

## 2023-07-20 DIAGNOSIS — Z98.61 CAD S/P PERCUTANEOUS CORONARY ANGIOPLASTY: ICD-10-CM

## 2023-07-21 ENCOUNTER — TELEPHONE (OUTPATIENT)
Dept: CARDIOLOGY CLINIC | Facility: CLINIC | Age: 68
End: 2023-07-21

## 2023-07-21 NOTE — TELEPHONE ENCOUNTER
We are in receipt of fax from [facility]. He is scheduled for RLE Open Femoral Endarterectomy w/ patch angioplasty/stenting on 8/10/23.     Letter is in queue

## 2023-07-21 NOTE — LETTER
Cardiology Pre Operative Clearance      PRE OPERATIVE CARDIAC RISK ASSESSMENT    07/21/23    Peter Thrasher  1955  [id number]    Date of Surgery: 08/10/23    Type of Surgery: RLE Open Femoral Endarterectomy w/ Patch Angioplasty/stenting     Surgeon: Bran Garcias MD, RVT    {SLA AMB CARDIAC CONTRAINDICATIONS:76365}    Anticoagulation: {RESPONSE; YES/NO/NA:19980}    Physician Comment: ***    Electronically Signed: ***

## 2023-07-25 RX ORDER — EZETIMIBE 10 MG/1
10 TABLET ORAL DAILY
Qty: 30 TABLET | Refills: 11 | Status: SHIPPED | OUTPATIENT
Start: 2023-07-25 | End: 2023-08-26 | Stop reason: SDUPTHER

## 2023-07-31 ENCOUNTER — TELEPHONE (OUTPATIENT)
Dept: CARDIOLOGY CLINIC | Facility: CLINIC | Age: 68
End: 2023-07-31

## 2023-07-31 ENCOUNTER — OFFICE VISIT (OUTPATIENT)
Dept: CARDIOLOGY CLINIC | Facility: CLINIC | Age: 68
End: 2023-07-31
Payer: COMMERCIAL

## 2023-07-31 VITALS
SYSTOLIC BLOOD PRESSURE: 136 MMHG | DIASTOLIC BLOOD PRESSURE: 74 MMHG | WEIGHT: 269 LBS | BODY MASS INDEX: 37.66 KG/M2 | OXYGEN SATURATION: 98 % | HEIGHT: 71 IN | HEART RATE: 72 BPM

## 2023-07-31 DIAGNOSIS — Z98.61 CAD S/P PERCUTANEOUS CORONARY ANGIOPLASTY: ICD-10-CM

## 2023-07-31 DIAGNOSIS — I25.10 CAD S/P PERCUTANEOUS CORONARY ANGIOPLASTY: ICD-10-CM

## 2023-07-31 DIAGNOSIS — E11.00 TYPE 2 DIABETES MELLITUS WITH HYPEROSMOLARITY WITHOUT COMA, WITH LONG-TERM CURRENT USE OF INSULIN (HCC): ICD-10-CM

## 2023-07-31 DIAGNOSIS — Z79.4 TYPE 2 DIABETES MELLITUS WITH HYPEROSMOLARITY WITHOUT COMA, WITH LONG-TERM CURRENT USE OF INSULIN (HCC): ICD-10-CM

## 2023-07-31 DIAGNOSIS — E78.5 DYSLIPIDEMIA: ICD-10-CM

## 2023-07-31 DIAGNOSIS — Z01.810 PRE-OPERATIVE CARDIOVASCULAR EXAMINATION: Primary | ICD-10-CM

## 2023-07-31 DIAGNOSIS — I25.2 HISTORY OF ANTERIOR WALL MYOCARDIAL INFARCTION: ICD-10-CM

## 2023-07-31 DIAGNOSIS — I10 ESSENTIAL HYPERTENSION: ICD-10-CM

## 2023-07-31 PROCEDURE — 99213 OFFICE O/P EST LOW 20 MIN: CPT | Performed by: NURSE PRACTITIONER

## 2023-07-31 PROCEDURE — 93000 ELECTROCARDIOGRAM COMPLETE: CPT | Performed by: NURSE PRACTITIONER

## 2023-07-31 NOTE — PROGRESS NOTES
Progress Note - Cardiology Office  [facility]    Jermaine Lakhani 76 y.o. male MRN: [id number]  : 1955  Encounter: [id number]      Assessment:     1. Pre-operative cardiovascular examination    2. CAD S/P percutaneous coronary angioplasty    3. Dyslipidemia    4. Type 2 diabetes mellitus with hyperosmolarity without coma, with long-term current use of insulin ([address])    5. Essential hypertension    6. History of anterior wall myocardial infarction        Discussion Summary and Plan:  1. Volume Overload: Patient 30 pound weight gain over the last 3 to 6 months and a good weight for him. He states that a good weight for his is 235 pounds. He is currently 269 pounds    -   EF on nuclear stress test from  was gated at 60%    -   Patient does not feel that he is getting a good diuresis with Lasix 40 mg daily    -   Will change to Demadex 40 mg daily    -   BMP in one week    -   Continue Zestril 5 mg daily    -   Continue Aldactone 25 mg daily    -   Continue Coreg 6.25 mg bid    -   Low sodium diet    -   Encourage daily weight    -   2D echo to re-evaluate EF and heart function    2. Hypertension:  BP acceptable at this time    -   Continue Zestril 5 mg daily    -   Continue Aldactone 25 mg daily    -   Continue Coreg 6.25 mg bid    3. Diabetes: Recent HbgA1c was 8    -   Glucotrol recently increased by PCP    -   Patient is also on Insulin and Metformin therapy    -   Consider adding SGLP-2 inhibitor if cost effective    -   Managed per primary team    4   Dyslipidemia:  Continue Zetia and Lipitor     5. Peripheral arterial disease: Patient had surgery at [facility] [place] records not available    -   Has been on Chronic IV antibiotics for wound on right foot      6.   Preoperative cardiovascular examination: Patient surgery under general anesthesia    -   Obtain 2D echocardiogram which was previously ordered to evaluate cardiac function, structure wall motion    -   Obtain [address] Nuclear stress test to evaluate for ischemia        Patient / Yuliana Barrientos was advised and educated to call our office  immediately if  patient has any new symptoms of chest pain/shortness of breath, near-syncope, syncope, light headedness sustained palpitations  or any other cardiovascular symptoms before their scheduled follow-up appointment. Office number was provided #[phone]. Please call [phone] if any questions. Counseling :  A description of the counseling. Goals and Barriers. Patient's ability to self care: Yes  Medication side effect reviewed with patient in detail and all their questions answered to their satisfaction. HPI :     Sabino Garcia is a 76y.o. year old male who came for for open endarterectomy of his right femoral artery PAD and poor wound healing. He has had a nonhealing wound on his right foot for over 6 months and has been undergoing chronic IV antibiotic therapy with minimal improvement. He is scheduled with vascular surgery at [facility] early August for an open endarterectomy to hopefully improve blood flow to his right foot. In addition to his vascular issues, patient also notes lower extremity edema and a weight gain of approximately 30 pounds over the last 3 to 6 months. He saw Dr. Klaus Alfaro in May and at that time a echo was ordered for evaluation of his ejection fraction. Unfortunately been to wound issues he has not been able to get his echo scheduled. He also feels that the furosemide is causing diuresis. Patient history for coronary artery disease with previous anterior wall MI, PCI, dyslipidemia, hypertension, peripheral arterial disease and diabetes with most recent hemoglobin A1c of 10. Review of Systems   Constitutional: Positive for fatigue. Negative for activity change. HENT: Negative. Negative for congestion, facial swelling, sinus pain and tinnitus. Eyes: Negative.   Negative for photophobia and visual disturbance. Respiratory: Positive for shortness of breath. Negative for chest tightness. Cardiovascular: Positive for leg swelling. Negative for chest pain and palpitations. Gastrointestinal: Negative. Negative for abdominal distention, diarrhea, nausea and vomiting. Endocrine: Negative. Negative for polydipsia, polyphagia and polyuria. Genitourinary: Negative. Negative for difficulty urinating. Musculoskeletal: Positive for arthralgias, back pain and gait problem. Skin: Negative. Neurological: Negative for dizziness, syncope, weakness and light-headedness. Hematological: Negative. Psychiatric/Behavioral: Negative.         Historical Information   Past Medical History:   Diagnosis Date   • Diabetes mellitus ([address])    • Hypertension    • Obesity      Past Surgical History:   Procedure Laterality Date   • CORONARY ANGIOPLASTY WITH STENT PLACEMENT     • ROTATOR CUFF REPAIR Right      Social History     Substance and Sexual Activity   Alcohol Use Not Currently     Social History     Substance and Sexual Activity   Drug Use Never     Social History     Tobacco Use   Smoking Status Former   Smokeless Tobacco Never     Family History:   Family History   Problem Relation Age of Onset   • Dementia Mother    • Diabetes Father        Meds/Allergies     No Known Allergies    Current Outpatient Medications:   •  Accu-Chek Guide test strip, , Disp: , Rfl:   •  amitriptyline (ELAVIL) 10 mg tablet, Take 1 tablet (10 mg total) by mouth daily at bedtime, Disp: 90 tablet, Rfl: 1  •  aspirin (ECOTRIN LOW STRENGTH) 81 mg EC tablet, Take 1 tablet (81 mg total) by mouth daily, Disp: 30 tablet, Rfl: 0  •  atorvastatin (LIPITOR) 40 mg tablet, Take 1 tablet (40 mg total) by mouth daily, Disp: 30 tablet, Rfl: 5  •  carvedilol (COREG) 6.25 mg tablet, Take 1 tablet (6.25 mg total) by mouth 2 (two) times a day with meals, Disp: 60 tablet, Rfl: 5  •  ezetimibe (ZETIA) 10 mg tablet, Take 1 tablet (10 mg total) by mouth daily, Disp: 30 tablet, Rfl: 11  •  furosemide (LASIX) 40 mg tablet, Take 1 tablet (40 mg total) by mouth daily as needed (Leg swelling), Disp: 30 tablet, Rfl: 0  •  glipiZIDE (GLUCOTROL) 10 mg tablet, Take 1 tablet (10 mg total) by mouth 2 (two) times a day before meals, Disp: 180 tablet, Rfl: 0  •  ibuprofen (MOTRIN) 600 mg tablet, Take 1 tablet (600 mg total) by mouth every 8 (eight) hours as needed for mild pain or moderate pain, Disp: 60 tablet, Rfl: 0  •  insulin aspart protamine-insulin aspart (NovoLOG 70/30) 100 units/mL injection, Inject 60 Units under the skin 2 (two) times a day before meals, Disp: 10 mL, Rfl: 0  •  lisinopril (ZESTRIL) 5 mg tablet, Take 1 tablet (5 mg total) by mouth daily, Disp: 30 tablet, Rfl: 5  •  metFORMIN (GLUCOPHAGE) 500 mg tablet, Take 1 tablet (500 mg total) by mouth 2 (two) times a day with meals, Disp: 180 tablet, Rfl: 0  •  pregabalin (LYRICA) 300 MG capsule, TAKE ONE CAPSULE BY MOUTH TWICE A DAY, Disp: 60 capsule, Rfl: 1  •  RELION INSULIN SYRINGE 1ML/31G 31G X 5/16" 1 ML MISC, USE TWICE DAILY WITH INSULIN, Disp: , Rfl:   •  spironolactone (ALDACTONE) 25 mg tablet, Take 1 tablet (25 mg total) by mouth daily, Disp: 30 tablet, Rfl: 5  •  silver sulfadiazine (SILVADENE,SSD) 1 % cream, Apply topically daily as needed for wound care (as needed for wound care) (Patient not taking: Reported on 7/31/2023), Disp: 50 g, Rfl: 0    Vitals: Blood pressure 136/74, pulse 72, height 5' 11" (1.803 m), weight 122 kg (269 lb), SpO2 98 %. Body mass index is 37.52 kg/m². Wt Readings from Last 3 Encounters:   07/31/23 122 kg (269 lb)   07/03/23 121 kg (267 lb)   05/23/23 117 kg (259 lb)     Vitals:    07/31/23 1350   Weight: 122 kg (269 lb)     BP Readings from Last 3 Encounters:   07/31/23 136/74   07/03/23 126/64   05/23/23 120/70       Physical Exam:  Physical Exam  Vitals and nursing note reviewed. Constitutional:       Appearance: Normal appearance. He is morbidly obese. Cardiovascular:      Rate and Rhythm: Normal rate and regular rhythm. Pulses: Normal pulses. Heart sounds: Murmur heard. Pulmonary:      Effort: Pulmonary effort is normal. No respiratory distress. Breath sounds: Normal breath sounds. Abdominal:      General: Bowel sounds are normal. There is distension. Palpations: Abdomen is soft. Musculoskeletal:      Right lower leg: Edema present. Left lower leg: Edema present. Comments: +2-3 Lower extremity edema   Skin:     General: Skin is warm and dry. Capillary Refill: Capillary refill takes less than 2 seconds. Neurological:      General: No focal deficit present. Mental Status: He is alert and oriented to person, place, and time. Mental status is at baseline. Psychiatric:         Mood and Affect: Mood normal.         Behavior: Behavior is cooperative. Diagnostic Studies Review Cardio:      EKG:  Sinus Rhythm with borderline 1st degree AVHB. Unchanged from previous EKG      [facility]  Cardiology        "This note was completed in part utilizing docBeat direct voice recognition software. Grammatical errors, random word insertion, spelling mistakes, and incomplete sentences may be an occasional consequence of the system secondary to software limitations, ambient noise and hardware issues. Please read the chart carefully and recognize, using context, where substitutions have occurred.   If you have any questions or concerns about the context, text or information contained within the body of this dictation, please contact myself, the provider, for further clarification."

## 2023-08-15 ENCOUNTER — VBI (OUTPATIENT)
Dept: ADMINISTRATIVE | Facility: OTHER | Age: 68
End: 2023-08-15

## 2023-08-17 DIAGNOSIS — M54.50 CHRONIC LOW BACK PAIN, UNSPECIFIED BACK PAIN LATERALITY, UNSPECIFIED WHETHER SCIATICA PRESENT: ICD-10-CM

## 2023-08-17 DIAGNOSIS — G89.29 CHRONIC LOW BACK PAIN, UNSPECIFIED BACK PAIN LATERALITY, UNSPECIFIED WHETHER SCIATICA PRESENT: ICD-10-CM

## 2023-08-18 RX ORDER — PREGABALIN 300 MG/1
300 CAPSULE ORAL 2 TIMES DAILY
Qty: 60 CAPSULE | Refills: 0 | Status: SHIPPED | OUTPATIENT
Start: 2023-08-18

## 2023-08-25 ENCOUNTER — TRANSITIONAL CARE MANAGEMENT (OUTPATIENT)
Dept: FAMILY MEDICINE CLINIC | Facility: CLINIC | Age: 68
End: 2023-08-25

## 2023-08-25 RX ORDER — CLOPIDOGREL BISULFATE 75 MG/1
75 TABLET ORAL DAILY
COMMUNITY
Start: 2023-08-17 | End: 2023-09-16

## 2023-08-25 RX ORDER — TRAMADOL HYDROCHLORIDE 50 MG/1
50 TABLET ORAL 2 TIMES DAILY
COMMUNITY

## 2023-08-25 RX ORDER — OXYCODONE HYDROCHLORIDE AND ACETAMINOPHEN 5; 325 MG/1; MG/1
TABLET ORAL
COMMUNITY
Start: 2023-08-16

## 2023-08-25 RX ORDER — FUROSEMIDE 40 MG/1
40 TABLET ORAL DAILY
COMMUNITY
Start: 2023-08-17 | End: 2023-09-16

## 2023-08-25 RX ORDER — TRAMADOL HYDROCHLORIDE 50 MG/1
50 TABLET ORAL 2 TIMES DAILY
OUTPATIENT
Start: 2023-08-25

## 2023-08-25 RX ORDER — OXYCODONE AND ACETAMINOPHEN 10; 325 MG/1; MG/1
TABLET ORAL
COMMUNITY
Start: 2023-07-20

## 2023-08-26 DIAGNOSIS — E78.5 DYSLIPIDEMIA: ICD-10-CM

## 2023-08-26 DIAGNOSIS — I25.10 CAD S/P PERCUTANEOUS CORONARY ANGIOPLASTY: ICD-10-CM

## 2023-08-26 DIAGNOSIS — G89.29 CHRONIC LOW BACK PAIN, UNSPECIFIED BACK PAIN LATERALITY, UNSPECIFIED WHETHER SCIATICA PRESENT: Primary | ICD-10-CM

## 2023-08-26 DIAGNOSIS — Z98.61 CAD S/P PERCUTANEOUS CORONARY ANGIOPLASTY: ICD-10-CM

## 2023-08-26 DIAGNOSIS — M54.50 CHRONIC LOW BACK PAIN, UNSPECIFIED BACK PAIN LATERALITY, UNSPECIFIED WHETHER SCIATICA PRESENT: Primary | ICD-10-CM

## 2023-08-28 RX ORDER — EZETIMIBE 10 MG/1
10 TABLET ORAL DAILY
Qty: 30 TABLET | Refills: 5 | Status: SHIPPED | OUTPATIENT
Start: 2023-08-28

## 2023-08-28 RX ORDER — OXYCODONE HYDROCHLORIDE AND ACETAMINOPHEN 5; 325 MG/1; MG/1
TABLET ORAL
Refills: 0 | OUTPATIENT
Start: 2023-08-28

## 2023-08-28 RX ORDER — OXYCODONE AND ACETAMINOPHEN 10; 325 MG/1; MG/1
1 TABLET ORAL EVERY 6 HOURS PRN
Qty: 30 TABLET | Refills: 0 | OUTPATIENT
Start: 2023-08-28

## 2023-09-07 RX ORDER — TRAMADOL HYDROCHLORIDE 50 MG/1
50 TABLET ORAL 2 TIMES DAILY
Refills: 0 | OUTPATIENT
Start: 2023-09-07

## 2023-09-12 DIAGNOSIS — E11.40 TYPE 2 DIABETES MELLITUS WITH DIABETIC NEUROPATHY, WITH LONG-TERM CURRENT USE OF INSULIN (HCC): ICD-10-CM

## 2023-09-12 DIAGNOSIS — E11.00 TYPE 2 DIABETES MELLITUS WITH HYPEROSMOLARITY WITHOUT COMA, WITH LONG-TERM CURRENT USE OF INSULIN (HCC): ICD-10-CM

## 2023-09-12 DIAGNOSIS — E78.5 DYSLIPIDEMIA: ICD-10-CM

## 2023-09-12 DIAGNOSIS — Z79.4 TYPE 2 DIABETES MELLITUS WITH HYPEROSMOLARITY WITHOUT COMA, WITH LONG-TERM CURRENT USE OF INSULIN (HCC): ICD-10-CM

## 2023-09-12 DIAGNOSIS — I25.10 CAD S/P PERCUTANEOUS CORONARY ANGIOPLASTY: ICD-10-CM

## 2023-09-12 DIAGNOSIS — I73.9 PAD (PERIPHERAL ARTERY DISEASE) (HCC): Primary | ICD-10-CM

## 2023-09-12 DIAGNOSIS — E66.9 CLASS 2 OBESITY: ICD-10-CM

## 2023-09-12 DIAGNOSIS — Z98.61 CAD S/P PERCUTANEOUS CORONARY ANGIOPLASTY: ICD-10-CM

## 2023-09-12 DIAGNOSIS — Z79.4 TYPE 2 DIABETES MELLITUS WITH DIABETIC NEUROPATHY, WITH LONG-TERM CURRENT USE OF INSULIN (HCC): ICD-10-CM

## 2023-09-12 DIAGNOSIS — M54.32 SCIATICA, LEFT SIDE: ICD-10-CM

## 2023-09-12 DIAGNOSIS — I25.2 HISTORY OF ANTERIOR WALL MYOCARDIAL INFARCTION: ICD-10-CM

## 2023-09-12 DIAGNOSIS — I10 ESSENTIAL HYPERTENSION: ICD-10-CM

## 2023-09-13 RX ORDER — SPIRONOLACTONE 25 MG/1
25 TABLET ORAL DAILY
Qty: 30 TABLET | Refills: 5 | Status: SHIPPED | OUTPATIENT
Start: 2023-09-13

## 2023-09-13 RX ORDER — BLOOD SUGAR DIAGNOSTIC
STRIP MISCELLANEOUS
Qty: 100 EACH | Refills: 3 | Status: SHIPPED | OUTPATIENT
Start: 2023-09-13

## 2023-09-13 RX ORDER — LISINOPRIL 5 MG/1
5 TABLET ORAL DAILY
Qty: 30 TABLET | Refills: 5 | Status: SHIPPED | OUTPATIENT
Start: 2023-09-13

## 2023-09-13 RX ORDER — FUROSEMIDE 40 MG/1
40 TABLET ORAL DAILY
Qty: 30 TABLET | Refills: 0 | Status: SHIPPED | OUTPATIENT
Start: 2023-09-13 | End: 2023-10-13

## 2023-09-13 RX ORDER — CARVEDILOL 6.25 MG/1
6.25 TABLET ORAL 2 TIMES DAILY WITH MEALS
Qty: 60 TABLET | Refills: 5 | Status: SHIPPED | OUTPATIENT
Start: 2023-09-13

## 2023-09-13 RX ORDER — ATORVASTATIN CALCIUM 40 MG/1
40 TABLET, FILM COATED ORAL DAILY
Qty: 30 TABLET | Refills: 5 | Status: SHIPPED | OUTPATIENT
Start: 2023-09-13

## 2023-09-13 RX ORDER — GLIPIZIDE 10 MG/1
10 TABLET ORAL
Qty: 180 TABLET | Refills: 0 | Status: SHIPPED | OUTPATIENT
Start: 2023-09-13

## 2023-09-13 RX ORDER — IBUPROFEN 600 MG/1
600 TABLET ORAL EVERY 8 HOURS PRN
Qty: 60 TABLET | Refills: 0 | Status: SHIPPED | OUTPATIENT
Start: 2023-09-13

## 2023-10-07 DIAGNOSIS — E11.40 TYPE 2 DIABETES MELLITUS WITH DIABETIC NEUROPATHY, WITH LONG-TERM CURRENT USE OF INSULIN (HCC): ICD-10-CM

## 2023-10-07 DIAGNOSIS — E66.9 CLASS 2 OBESITY: ICD-10-CM

## 2023-10-07 DIAGNOSIS — Z79.4 TYPE 2 DIABETES MELLITUS WITH DIABETIC NEUROPATHY, WITH LONG-TERM CURRENT USE OF INSULIN (HCC): ICD-10-CM

## 2023-10-08 DIAGNOSIS — G89.29 CHRONIC LOW BACK PAIN, UNSPECIFIED BACK PAIN LATERALITY, UNSPECIFIED WHETHER SCIATICA PRESENT: ICD-10-CM

## 2023-10-08 DIAGNOSIS — M54.50 CHRONIC LOW BACK PAIN, UNSPECIFIED BACK PAIN LATERALITY, UNSPECIFIED WHETHER SCIATICA PRESENT: ICD-10-CM

## 2023-10-09 RX ORDER — BLOOD SUGAR DIAGNOSTIC
STRIP MISCELLANEOUS
Qty: 100 EACH | Refills: 0 | Status: SHIPPED | OUTPATIENT
Start: 2023-10-09

## 2023-10-09 RX ORDER — PREGABALIN 300 MG/1
300 CAPSULE ORAL 2 TIMES DAILY
Qty: 60 CAPSULE | Refills: 0 | Status: SHIPPED | OUTPATIENT
Start: 2023-10-09

## 2023-10-09 NOTE — TELEPHONE ENCOUNTER
Why is patient still on silver sulfadiazine? Is the wound still open.   This is usually not a long-term medication

## 2023-11-04 DIAGNOSIS — I25.2 HISTORY OF ANTERIOR WALL MYOCARDIAL INFARCTION: ICD-10-CM

## 2023-11-04 DIAGNOSIS — Z98.61 CAD S/P PERCUTANEOUS CORONARY ANGIOPLASTY: ICD-10-CM

## 2023-11-04 DIAGNOSIS — M54.50 CHRONIC LOW BACK PAIN, UNSPECIFIED BACK PAIN LATERALITY, UNSPECIFIED WHETHER SCIATICA PRESENT: ICD-10-CM

## 2023-11-04 DIAGNOSIS — Z79.4 TYPE 2 DIABETES MELLITUS WITH DIABETIC NEUROPATHY, WITH LONG-TERM CURRENT USE OF INSULIN (HCC): ICD-10-CM

## 2023-11-04 DIAGNOSIS — E11.40 TYPE 2 DIABETES MELLITUS WITH DIABETIC NEUROPATHY, WITH LONG-TERM CURRENT USE OF INSULIN (HCC): ICD-10-CM

## 2023-11-04 DIAGNOSIS — G89.29 CHRONIC LOW BACK PAIN, UNSPECIFIED BACK PAIN LATERALITY, UNSPECIFIED WHETHER SCIATICA PRESENT: ICD-10-CM

## 2023-11-04 DIAGNOSIS — E78.5 DYSLIPIDEMIA: ICD-10-CM

## 2023-11-04 DIAGNOSIS — I10 ESSENTIAL HYPERTENSION: ICD-10-CM

## 2023-11-04 DIAGNOSIS — I25.10 CAD S/P PERCUTANEOUS CORONARY ANGIOPLASTY: ICD-10-CM

## 2023-11-06 RX ORDER — CARVEDILOL 6.25 MG/1
6.25 TABLET ORAL 2 TIMES DAILY WITH MEALS
Qty: 60 TABLET | Refills: 5 | Status: SHIPPED | OUTPATIENT
Start: 2023-11-06

## 2023-11-06 RX ORDER — SPIRONOLACTONE 25 MG/1
25 TABLET ORAL DAILY
Qty: 30 TABLET | Refills: 5 | Status: SHIPPED | OUTPATIENT
Start: 2023-11-06

## 2023-11-06 RX ORDER — BLOOD SUGAR DIAGNOSTIC
STRIP MISCELLANEOUS
Qty: 100 EACH | Refills: 0 | Status: SHIPPED | OUTPATIENT
Start: 2023-11-06

## 2023-11-06 RX ORDER — EZETIMIBE 10 MG/1
10 TABLET ORAL DAILY
Qty: 30 TABLET | Refills: 5 | Status: SHIPPED | OUTPATIENT
Start: 2023-11-06

## 2023-11-06 RX ORDER — LISINOPRIL 5 MG/1
5 TABLET ORAL DAILY
Qty: 30 TABLET | Refills: 5 | Status: SHIPPED | OUTPATIENT
Start: 2023-11-06

## 2023-11-08 RX ORDER — PREGABALIN 300 MG/1
300 CAPSULE ORAL 2 TIMES DAILY
Qty: 28 CAPSULE | Refills: 0 | Status: SHIPPED | OUTPATIENT
Start: 2023-11-08 | End: 2023-11-22

## 2023-12-10 DIAGNOSIS — Z98.61 CAD S/P PERCUTANEOUS CORONARY ANGIOPLASTY: ICD-10-CM

## 2023-12-10 DIAGNOSIS — I10 ESSENTIAL HYPERTENSION: ICD-10-CM

## 2023-12-10 DIAGNOSIS — E78.5 DYSLIPIDEMIA: ICD-10-CM

## 2023-12-10 DIAGNOSIS — I25.2 HISTORY OF ANTERIOR WALL MYOCARDIAL INFARCTION: ICD-10-CM

## 2023-12-10 DIAGNOSIS — I25.10 CAD S/P PERCUTANEOUS CORONARY ANGIOPLASTY: ICD-10-CM

## 2023-12-11 ENCOUNTER — VBI (OUTPATIENT)
Dept: ADMINISTRATIVE | Facility: OTHER | Age: 68
End: 2023-12-11

## 2023-12-11 RX ORDER — EZETIMIBE 10 MG/1
10 TABLET ORAL DAILY
Qty: 30 TABLET | Refills: 5 | Status: SHIPPED | OUTPATIENT
Start: 2023-12-11

## 2023-12-11 RX ORDER — LISINOPRIL 5 MG/1
5 TABLET ORAL DAILY
Qty: 30 TABLET | Refills: 5 | Status: SHIPPED | OUTPATIENT
Start: 2023-12-11

## 2023-12-11 RX ORDER — CARVEDILOL 6.25 MG/1
6.25 TABLET ORAL 2 TIMES DAILY WITH MEALS
Qty: 60 TABLET | Refills: 5 | Status: SHIPPED | OUTPATIENT
Start: 2023-12-11

## 2023-12-11 RX ORDER — SPIRONOLACTONE 25 MG/1
25 TABLET ORAL DAILY
Qty: 30 TABLET | Refills: 5 | Status: SHIPPED | OUTPATIENT
Start: 2023-12-11

## 2023-12-22 ENCOUNTER — DOCUMENTATION (OUTPATIENT)
Dept: BEHAVIORAL/MENTAL HEALTH CLINIC | Facility: CLINIC | Age: 68
End: 2023-12-22
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
## 2024-01-30 ENCOUNTER — VBI (OUTPATIENT)
Dept: ADMINISTRATIVE | Facility: OTHER | Age: 69
End: 2024-01-30

## 2024-02-01 DIAGNOSIS — I25.10 CAD S/P PERCUTANEOUS CORONARY ANGIOPLASTY: ICD-10-CM

## 2024-02-01 DIAGNOSIS — I10 ESSENTIAL HYPERTENSION: ICD-10-CM

## 2024-02-01 DIAGNOSIS — E78.5 DYSLIPIDEMIA: ICD-10-CM

## 2024-02-01 DIAGNOSIS — I25.2 HISTORY OF ANTERIOR WALL MYOCARDIAL INFARCTION: ICD-10-CM

## 2024-02-01 DIAGNOSIS — Z79.4 TYPE 2 DIABETES MELLITUS WITH DIABETIC NEUROPATHY, WITH LONG-TERM CURRENT USE OF INSULIN (HCC): ICD-10-CM

## 2024-02-01 DIAGNOSIS — Z98.61 CAD S/P PERCUTANEOUS CORONARY ANGIOPLASTY: ICD-10-CM

## 2024-02-01 DIAGNOSIS — E11.40 TYPE 2 DIABETES MELLITUS WITH DIABETIC NEUROPATHY, WITH LONG-TERM CURRENT USE OF INSULIN (HCC): ICD-10-CM

## 2024-02-01 DIAGNOSIS — I73.9 PAD (PERIPHERAL ARTERY DISEASE) (HCC): ICD-10-CM

## 2024-02-01 RX ORDER — EZETIMIBE 10 MG/1
10 TABLET ORAL DAILY
Qty: 90 TABLET | Refills: 0 | Status: SHIPPED | OUTPATIENT
Start: 2024-02-01

## 2024-02-01 RX ORDER — GLIPIZIDE 10 MG/1
10 TABLET ORAL
Qty: 180 TABLET | Refills: 0 | Status: SHIPPED | OUTPATIENT
Start: 2024-02-01

## 2024-02-01 RX ORDER — ASPIRIN 81 MG/1
81 TABLET ORAL DAILY
Qty: 30 TABLET | Refills: 0 | Status: SHIPPED | OUTPATIENT
Start: 2024-02-01

## 2024-02-01 RX ORDER — LISINOPRIL 5 MG/1
5 TABLET ORAL DAILY
Qty: 90 TABLET | Refills: 0 | Status: SHIPPED | OUTPATIENT
Start: 2024-02-01

## 2024-02-01 RX ORDER — CARVEDILOL 6.25 MG/1
6.25 TABLET ORAL 2 TIMES DAILY WITH MEALS
Qty: 180 TABLET | Refills: 0 | Status: SHIPPED | OUTPATIENT
Start: 2024-02-01

## 2024-02-01 RX ORDER — SPIRONOLACTONE 25 MG/1
25 TABLET ORAL DAILY
Qty: 90 TABLET | Refills: 0 | Status: SHIPPED | OUTPATIENT
Start: 2024-02-01

## 2024-02-05 DIAGNOSIS — Z79.4 TYPE 2 DIABETES MELLITUS WITH DIABETIC NEUROPATHY, WITH LONG-TERM CURRENT USE OF INSULIN (HCC): ICD-10-CM

## 2024-02-05 DIAGNOSIS — E11.40 TYPE 2 DIABETES MELLITUS WITH DIABETIC NEUROPATHY, WITH LONG-TERM CURRENT USE OF INSULIN (HCC): ICD-10-CM

## 2024-02-06 RX ORDER — BLOOD SUGAR DIAGNOSTIC
STRIP MISCELLANEOUS
Qty: 100 EACH | Refills: 1 | Status: SHIPPED | OUTPATIENT
Start: 2024-02-06 | End: 2024-02-14

## 2024-02-07 ENCOUNTER — TELEPHONE (OUTPATIENT)
Dept: FAMILY MEDICINE CLINIC | Facility: CLINIC | Age: 69
End: 2024-02-07

## 2024-02-07 NOTE — TELEPHONE ENCOUNTER
Dr. Mcconnell:    Pharmacy called stating that patient's health insurance does not cover Accu-Check.  Please send in new Rx for One Touch Glucometer test strips and lancets.

## 2024-02-14 DIAGNOSIS — Z79.4 TYPE 2 DIABETES MELLITUS WITH DIABETIC NEUROPATHY, WITH LONG-TERM CURRENT USE OF INSULIN (HCC): Primary | ICD-10-CM

## 2024-02-14 DIAGNOSIS — E11.40 TYPE 2 DIABETES MELLITUS WITH DIABETIC NEUROPATHY, WITH LONG-TERM CURRENT USE OF INSULIN (HCC): Primary | ICD-10-CM

## 2024-02-14 RX ORDER — LANCETS 33 GAUGE
EACH MISCELLANEOUS
Qty: 300 EACH | Refills: 3 | Status: SHIPPED | OUTPATIENT
Start: 2024-02-14

## 2024-02-14 RX ORDER — BLOOD SUGAR DIAGNOSTIC
STRIP MISCELLANEOUS
Qty: 300 EACH | Refills: 3 | Status: SHIPPED | OUTPATIENT
Start: 2024-02-14

## 2024-02-14 RX ORDER — BLOOD-GLUCOSE METER
KIT MISCELLANEOUS
Qty: 1 KIT | Refills: 0 | Status: SHIPPED | OUTPATIENT
Start: 2024-02-14

## 2024-02-14 NOTE — TELEPHONE ENCOUNTER
Pl, advise pt -  were sent    Ultrasound Used Text: Ultrasound was utilized to place radiation therapy fields.

## 2024-04-16 DIAGNOSIS — I10 ESSENTIAL HYPERTENSION: ICD-10-CM

## 2024-04-16 DIAGNOSIS — M54.32 SCIATICA, LEFT SIDE: ICD-10-CM

## 2024-04-16 RX ORDER — IBUPROFEN 600 MG/1
600 TABLET ORAL EVERY 8 HOURS PRN
Qty: 60 TABLET | Refills: 0 | Status: SHIPPED | OUTPATIENT
Start: 2024-04-16

## 2024-04-17 DIAGNOSIS — I10 ESSENTIAL HYPERTENSION: ICD-10-CM

## 2024-04-18 RX ORDER — SPIRONOLACTONE 25 MG/1
25 TABLET ORAL DAILY
Qty: 90 TABLET | Refills: 0 | Status: SHIPPED | OUTPATIENT
Start: 2024-04-18

## 2024-04-18 RX ORDER — SPIRONOLACTONE 25 MG/1
TABLET ORAL
Qty: 90 TABLET | Refills: 0 | OUTPATIENT
Start: 2024-04-18

## 2024-04-23 ENCOUNTER — TELEPHONE (OUTPATIENT)
Dept: CARDIOLOGY CLINIC | Facility: CLINIC | Age: 69
End: 2024-04-23

## 2024-04-23 RX ORDER — LISINOPRIL 5 MG/1
5 TABLET ORAL DAILY
Qty: 90 TABLET | Refills: 1 | Status: SHIPPED | OUTPATIENT
Start: 2024-04-23

## 2024-05-06 DIAGNOSIS — E78.5 DYSLIPIDEMIA: ICD-10-CM

## 2024-05-06 DIAGNOSIS — Z98.61 CAD S/P PERCUTANEOUS CORONARY ANGIOPLASTY: ICD-10-CM

## 2024-05-06 DIAGNOSIS — I25.10 CAD S/P PERCUTANEOUS CORONARY ANGIOPLASTY: ICD-10-CM

## 2024-05-06 DIAGNOSIS — Z79.4 TYPE 2 DIABETES MELLITUS WITH HYPEROSMOLARITY WITHOUT COMA, WITH LONG-TERM CURRENT USE OF INSULIN (HCC): ICD-10-CM

## 2024-05-06 DIAGNOSIS — E11.00 TYPE 2 DIABETES MELLITUS WITH HYPEROSMOLARITY WITHOUT COMA, WITH LONG-TERM CURRENT USE OF INSULIN (HCC): ICD-10-CM

## 2024-05-06 RX ORDER — ATORVASTATIN CALCIUM 40 MG/1
40 TABLET, FILM COATED ORAL DAILY
Qty: 100 TABLET | Refills: 0 | Status: SHIPPED | OUTPATIENT
Start: 2024-05-06

## 2024-05-08 ENCOUNTER — TELEPHONE (OUTPATIENT)
Dept: FAMILY MEDICINE CLINIC | Facility: CLINIC | Age: 69
End: 2024-05-08

## 2024-05-26 DIAGNOSIS — Z98.61 CAD S/P PERCUTANEOUS CORONARY ANGIOPLASTY: ICD-10-CM

## 2024-05-26 DIAGNOSIS — E78.5 DYSLIPIDEMIA: ICD-10-CM

## 2024-05-26 DIAGNOSIS — I25.10 CAD S/P PERCUTANEOUS CORONARY ANGIOPLASTY: ICD-10-CM

## 2024-05-26 RX ORDER — EZETIMIBE 10 MG/1
10 TABLET ORAL DAILY
Qty: 30 TABLET | Refills: 0 | Status: SHIPPED | OUTPATIENT
Start: 2024-05-26

## 2024-05-28 DIAGNOSIS — I25.10 CAD S/P PERCUTANEOUS CORONARY ANGIOPLASTY: ICD-10-CM

## 2024-05-28 DIAGNOSIS — I25.2 HISTORY OF ANTERIOR WALL MYOCARDIAL INFARCTION: ICD-10-CM

## 2024-05-28 DIAGNOSIS — I10 ESSENTIAL HYPERTENSION: ICD-10-CM

## 2024-05-28 DIAGNOSIS — Z98.61 CAD S/P PERCUTANEOUS CORONARY ANGIOPLASTY: ICD-10-CM

## 2024-05-28 RX ORDER — CARVEDILOL 6.25 MG/1
6.25 TABLET ORAL 2 TIMES DAILY WITH MEALS
Qty: 180 TABLET | Refills: 0 | Status: SHIPPED | OUTPATIENT
Start: 2024-05-28

## 2024-06-09 DIAGNOSIS — E11.40 TYPE 2 DIABETES MELLITUS WITH DIABETIC NEUROPATHY, WITH LONG-TERM CURRENT USE OF INSULIN (HCC): ICD-10-CM

## 2024-06-09 DIAGNOSIS — Z79.4 TYPE 2 DIABETES MELLITUS WITH DIABETIC NEUROPATHY, WITH LONG-TERM CURRENT USE OF INSULIN (HCC): ICD-10-CM

## 2024-06-11 DIAGNOSIS — E11.40 TYPE 2 DIABETES MELLITUS WITH DIABETIC NEUROPATHY, WITH LONG-TERM CURRENT USE OF INSULIN (HCC): ICD-10-CM

## 2024-06-11 DIAGNOSIS — Z79.4 TYPE 2 DIABETES MELLITUS WITH DIABETIC NEUROPATHY, WITH LONG-TERM CURRENT USE OF INSULIN (HCC): ICD-10-CM

## 2024-06-12 RX ORDER — GLIPIZIDE 10 MG/1
TABLET ORAL
Qty: 180 TABLET | Refills: 1 | Status: SHIPPED | OUTPATIENT
Start: 2024-06-12

## 2024-06-30 DIAGNOSIS — M54.32 SCIATICA, LEFT SIDE: ICD-10-CM

## 2024-07-01 RX ORDER — IBUPROFEN 600 MG/1
600 TABLET ORAL EVERY 8 HOURS PRN
Qty: 60 TABLET | Refills: 0 | Status: SHIPPED | OUTPATIENT
Start: 2024-07-01

## 2024-07-23 DIAGNOSIS — I10 ESSENTIAL HYPERTENSION: ICD-10-CM

## 2024-07-23 RX ORDER — SPIRONOLACTONE 25 MG/1
25 TABLET ORAL DAILY
Qty: 90 TABLET | Refills: 0 | Status: SHIPPED | OUTPATIENT
Start: 2024-07-23

## 2024-08-04 DIAGNOSIS — Z79.4 TYPE 2 DIABETES MELLITUS WITH DIABETIC NEUROPATHY, WITH LONG-TERM CURRENT USE OF INSULIN (HCC): ICD-10-CM

## 2024-08-04 DIAGNOSIS — E11.40 TYPE 2 DIABETES MELLITUS WITH DIABETIC NEUROPATHY, WITH LONG-TERM CURRENT USE OF INSULIN (HCC): ICD-10-CM

## 2024-10-13 DIAGNOSIS — I10 ESSENTIAL HYPERTENSION: ICD-10-CM

## 2024-10-13 DIAGNOSIS — I25.10 CAD S/P PERCUTANEOUS CORONARY ANGIOPLASTY: ICD-10-CM

## 2024-10-13 DIAGNOSIS — E78.5 DYSLIPIDEMIA: ICD-10-CM

## 2024-10-13 DIAGNOSIS — I25.2 HISTORY OF ANTERIOR WALL MYOCARDIAL INFARCTION: ICD-10-CM

## 2024-10-13 DIAGNOSIS — Z98.61 CAD S/P PERCUTANEOUS CORONARY ANGIOPLASTY: ICD-10-CM

## 2024-10-15 RX ORDER — LISINOPRIL 5 MG/1
5 TABLET ORAL DAILY
Qty: 90 TABLET | Refills: 3 | Status: SHIPPED | OUTPATIENT
Start: 2024-10-15

## 2024-10-21 RX ORDER — EZETIMIBE 10 MG/1
10 TABLET ORAL DAILY
Qty: 30 TABLET | Refills: 1 | Status: SHIPPED | OUTPATIENT
Start: 2024-10-21

## 2024-10-21 RX ORDER — SPIRONOLACTONE 25 MG/1
25 TABLET ORAL DAILY
Qty: 90 TABLET | Refills: 1 | Status: SHIPPED | OUTPATIENT
Start: 2024-10-21

## 2024-10-21 RX ORDER — CARVEDILOL 6.25 MG/1
6.25 TABLET ORAL 2 TIMES DAILY WITH MEALS
Qty: 180 TABLET | Refills: 1 | Status: SHIPPED | OUTPATIENT
Start: 2024-10-21

## 2024-10-23 ENCOUNTER — RA CDI HCC (OUTPATIENT)
Dept: OTHER | Facility: HOSPITAL | Age: 69
End: 2024-10-23

## 2024-10-23 NOTE — PROGRESS NOTES
HCC coding opportunities          Chart Reviewed number of suggestions sent to Provider: 2  E11.51  E11.65  Z89.411  Z89.421     Patients Insurance     Medicare Insurance: [facility] Medicare Advantage

## 2025-01-07 ENCOUNTER — TELEPHONE (OUTPATIENT)
Dept: FAMILY MEDICINE CLINIC | Facility: CLINIC | Age: 70
End: 2025-01-07

## 2025-05-21 ENCOUNTER — TELEPHONE (OUTPATIENT)
Dept: FAMILY MEDICINE CLINIC | Facility: CLINIC | Age: 70
End: 2025-05-21